# Patient Record
Sex: FEMALE | Race: WHITE | NOT HISPANIC OR LATINO
[De-identification: names, ages, dates, MRNs, and addresses within clinical notes are randomized per-mention and may not be internally consistent; named-entity substitution may affect disease eponyms.]

---

## 2017-04-19 ENCOUNTER — TRANSCRIPTION ENCOUNTER (OUTPATIENT)
Age: 32
End: 2017-04-19

## 2018-11-06 ENCOUNTER — TRANSCRIPTION ENCOUNTER (OUTPATIENT)
Age: 33
End: 2018-11-06

## 2019-01-30 ENCOUNTER — OUTPATIENT (OUTPATIENT)
Dept: OUTPATIENT SERVICES | Facility: HOSPITAL | Age: 34
LOS: 1 days | End: 2019-01-30
Payer: COMMERCIAL

## 2019-01-30 VITALS
SYSTOLIC BLOOD PRESSURE: 135 MMHG | DIASTOLIC BLOOD PRESSURE: 79 MMHG | TEMPERATURE: 98 F | WEIGHT: 184.97 LBS | RESPIRATION RATE: 16 BRPM | OXYGEN SATURATION: 99 % | HEART RATE: 78 BPM

## 2019-01-30 DIAGNOSIS — Z98.890 OTHER SPECIFIED POSTPROCEDURAL STATES: Chronic | ICD-10-CM

## 2019-01-30 DIAGNOSIS — N83.209 UNSPECIFIED OVARIAN CYST, UNSPECIFIED SIDE: ICD-10-CM

## 2019-01-30 DIAGNOSIS — Z01.818 ENCOUNTER FOR OTHER PREPROCEDURAL EXAMINATION: ICD-10-CM

## 2019-01-30 LAB
HCG SERPL-ACNC: <1 MIU/ML — SIGNIFICANT CHANGE UP
HCT VFR BLD CALC: 38.7 % — SIGNIFICANT CHANGE UP (ref 34.5–45)
HGB BLD-MCNC: 13.1 G/DL — SIGNIFICANT CHANGE UP (ref 11.5–15.5)
MCHC RBC-ENTMCNC: 32.2 PG — SIGNIFICANT CHANGE UP (ref 27–34)
MCHC RBC-ENTMCNC: 33.9 GM/DL — SIGNIFICANT CHANGE UP (ref 32–36)
MCV RBC AUTO: 95.1 FL — SIGNIFICANT CHANGE UP (ref 80–100)
NRBC # BLD: 0 /100 WBCS — SIGNIFICANT CHANGE UP (ref 0–0)
PLATELET # BLD AUTO: 205 K/UL — SIGNIFICANT CHANGE UP (ref 150–400)
RBC # BLD: 4.07 M/UL — SIGNIFICANT CHANGE UP (ref 3.8–5.2)
RBC # FLD: 12.9 % — SIGNIFICANT CHANGE UP (ref 10.3–14.5)
WBC # BLD: 6.91 K/UL — SIGNIFICANT CHANGE UP (ref 3.8–10.5)
WBC # FLD AUTO: 6.91 K/UL — SIGNIFICANT CHANGE UP (ref 3.8–10.5)

## 2019-01-30 PROCEDURE — 86900 BLOOD TYPING SEROLOGIC ABO: CPT

## 2019-01-30 PROCEDURE — G0463: CPT

## 2019-01-30 PROCEDURE — 85027 COMPLETE CBC AUTOMATED: CPT

## 2019-01-30 PROCEDURE — 86850 RBC ANTIBODY SCREEN: CPT

## 2019-01-30 PROCEDURE — 36415 COLL VENOUS BLD VENIPUNCTURE: CPT

## 2019-01-30 PROCEDURE — 86901 BLOOD TYPING SEROLOGIC RH(D): CPT

## 2019-01-30 PROCEDURE — 84702 CHORIONIC GONADOTROPIN TEST: CPT

## 2019-01-30 RX ORDER — DEXTROAMPHETAMINE SACCHARATE, AMPHETAMINE ASPARTATE, DEXTROAMPHETAMINE SULFATE AND AMPHETAMINE SULFATE 1.875; 1.875; 1.875; 1.875 MG/1; MG/1; MG/1; MG/1
0 TABLET ORAL
Qty: 0 | Refills: 0 | COMMUNITY

## 2019-01-30 NOTE — H&P PST ADULT - HISTORY OF PRESENT ILLNESS
34 yo female with PMH of PCOS and hypothyroidism here for PST. Pt complaining of having irregular menses for the last 2 years. Pt started to have abdominal bloating and abdominal cramping. Pt s/p MRI and was diagnosed with cyst of right ovary. Pt denies n/v/d and constipation. Pt electing for laparoscopic right ovarian cystectomy on 2/6/19.

## 2019-01-30 NOTE — H&P PST ADULT - PSYCHIATRIC
details… negative Affect and characteristics of appearance, verbalizations, behaviors are appropriate

## 2019-01-30 NOTE — H&P PST ADULT - NSANTHOSAYNRD_GEN_A_CORE
No. ANTON screening performed.  STOP BANG Legend: 0-2 = LOW Risk; 3-4 = INTERMEDIATE Risk; 5-8 = HIGH Risk

## 2019-01-30 NOTE — H&P PST ADULT - PMH
ADD (attention deficit disorder)    Hypothyroidism    PCOS (polycystic ovarian syndrome) ADD (attention deficit disorder)    Hypothyroidism    PCOS (polycystic ovarian syndrome)    Unspecified ovarian cyst, right side

## 2019-01-30 NOTE — H&P PST ADULT - FAMILY HISTORY
Father  Still living? Yes, Estimated age: Age Unknown  Hypertension, Age at diagnosis: Age Unknown     Mother  Still living? Yes, Estimated age: Age Unknown  Diabetes mellitus, Age at diagnosis: Age Unknown     Sibling  Still living? Yes, Estimated age: Age Unknown  Hypothyroidism, Age at diagnosis: Age Unknown  Bipolar disorder, Age at diagnosis: Age Unknown

## 2019-01-30 NOTE — H&P PST ADULT - NEGATIVE CARDIOVASCULAR SYMPTOMS
no peripheral edema/no palpitations/no orthopnea/no paroxysmal nocturnal dyspnea/no chest pain/no claudication/no dyspnea on exertion

## 2019-01-30 NOTE — H&P PST ADULT - PROBLEM SELECTOR PLAN 2
No medical clearance needed as per surgeon. CBC, T&S and HCG ordered. Pre-op instructions and surgical scrubs given and pt verbalized understanding.

## 2019-01-30 NOTE — H&P PST ADULT - GASTROINTESTINAL DETAILS
normal/no rigidity/no rebound tenderness/nontender/no organomegaly/bowel sounds normal/soft/no guarding/no distention/no bruit/no masses palpable

## 2019-01-30 NOTE — H&P PST ADULT - RS GEN PE MLT RESP DETAILS PC
good air movement/airway patent/clear to auscultation bilaterally/breath sounds equal/normal/respirations non-labored

## 2019-01-30 NOTE — H&P PST ADULT - PSH
History of D&C  (Missed Ab, 2008  History of sinus surgery  (1992) History of D&C  (Missed Ab, 2008)  History of sinus surgery  (1992)

## 2019-02-05 ENCOUNTER — TRANSCRIPTION ENCOUNTER (OUTPATIENT)
Age: 34
End: 2019-02-05

## 2019-02-05 RX ORDER — SODIUM CHLORIDE 9 MG/ML
1000 INJECTION, SOLUTION INTRAVENOUS
Qty: 0 | Refills: 0 | Status: DISCONTINUED | OUTPATIENT
Start: 2019-02-06 | End: 2019-02-21

## 2019-02-06 ENCOUNTER — RESULT REVIEW (OUTPATIENT)
Age: 34
End: 2019-02-06

## 2019-02-06 ENCOUNTER — OUTPATIENT (OUTPATIENT)
Dept: OUTPATIENT SERVICES | Facility: HOSPITAL | Age: 34
LOS: 1 days | End: 2019-02-06
Payer: COMMERCIAL

## 2019-02-06 VITALS
RESPIRATION RATE: 12 BRPM | TEMPERATURE: 98 F | HEART RATE: 80 BPM | OXYGEN SATURATION: 96 % | DIASTOLIC BLOOD PRESSURE: 77 MMHG | SYSTOLIC BLOOD PRESSURE: 128 MMHG

## 2019-02-06 DIAGNOSIS — Z98.890 OTHER SPECIFIED POSTPROCEDURAL STATES: Chronic | ICD-10-CM

## 2019-02-06 DIAGNOSIS — Z01.818 ENCOUNTER FOR OTHER PREPROCEDURAL EXAMINATION: ICD-10-CM

## 2019-02-06 DIAGNOSIS — N83.209 UNSPECIFIED OVARIAN CYST, UNSPECIFIED SIDE: ICD-10-CM

## 2019-02-06 DIAGNOSIS — N83.201 UNSPECIFIED OVARIAN CYST, RIGHT SIDE: ICD-10-CM

## 2019-02-06 PROCEDURE — 88305 TISSUE EXAM BY PATHOLOGIST: CPT | Mod: 26

## 2019-02-06 PROCEDURE — 88305 TISSUE EXAM BY PATHOLOGIST: CPT

## 2019-02-06 PROCEDURE — 88108 CYTOPATH CONCENTRATE TECH: CPT | Mod: 26

## 2019-02-06 PROCEDURE — C1889: CPT

## 2019-02-06 PROCEDURE — 58662 LAPAROSCOPY EXCISE LESIONS: CPT

## 2019-02-06 RX ORDER — NIACIN 50 MG
1 TABLET ORAL
Qty: 0 | Refills: 0 | COMMUNITY

## 2019-02-06 RX ORDER — OXYCODONE HYDROCHLORIDE 5 MG/1
5 TABLET ORAL ONCE
Qty: 0 | Refills: 0 | Status: DISCONTINUED | OUTPATIENT
Start: 2019-02-06 | End: 2019-02-06

## 2019-02-06 RX ORDER — HYDROMORPHONE HYDROCHLORIDE 2 MG/ML
0.5 INJECTION INTRAMUSCULAR; INTRAVENOUS; SUBCUTANEOUS
Qty: 0 | Refills: 0 | Status: DISCONTINUED | OUTPATIENT
Start: 2019-02-06 | End: 2019-02-06

## 2019-02-06 RX ORDER — SODIUM CHLORIDE 9 MG/ML
1000 INJECTION, SOLUTION INTRAVENOUS
Qty: 0 | Refills: 0 | Status: DISCONTINUED | OUTPATIENT
Start: 2019-02-06 | End: 2019-02-06

## 2019-02-06 RX ORDER — LEVOTHYROXINE SODIUM 125 MCG
1 TABLET ORAL
Qty: 0 | Refills: 0 | COMMUNITY

## 2019-02-06 RX ORDER — METOCLOPRAMIDE HCL 10 MG
5 TABLET ORAL ONCE
Qty: 0 | Refills: 0 | Status: COMPLETED | OUTPATIENT
Start: 2019-02-06 | End: 2019-02-06

## 2019-02-06 RX ORDER — DEXTROAMPHETAMINE SACCHARATE, AMPHETAMINE ASPARTATE, DEXTROAMPHETAMINE SULFATE AND AMPHETAMINE SULFATE 1.875; 1.875; 1.875; 1.875 MG/1; MG/1; MG/1; MG/1
0 TABLET ORAL
Qty: 0 | Refills: 0 | COMMUNITY

## 2019-02-06 RX ADMIN — Medication 5 MILLIGRAM(S): at 13:08

## 2019-02-06 RX ADMIN — SODIUM CHLORIDE 100 MILLILITER(S): 9 INJECTION, SOLUTION INTRAVENOUS at 08:40

## 2019-02-06 RX ADMIN — HYDROMORPHONE HYDROCHLORIDE 0.5 MILLIGRAM(S): 2 INJECTION INTRAMUSCULAR; INTRAVENOUS; SUBCUTANEOUS at 13:07

## 2019-02-06 RX ADMIN — HYDROMORPHONE HYDROCHLORIDE 0.5 MILLIGRAM(S): 2 INJECTION INTRAMUSCULAR; INTRAVENOUS; SUBCUTANEOUS at 13:22

## 2019-02-06 RX ADMIN — SODIUM CHLORIDE 100 MILLILITER(S): 9 INJECTION, SOLUTION INTRAVENOUS at 13:08

## 2019-02-06 NOTE — BRIEF OPERATIVE NOTE - PROCEDURE
<<-----Click on this checkbox to enter Procedure Ovarian cystectomy  02/06/2019  laparoscopic right ovarian cystectomy  Active  LPIBOON

## 2019-02-06 NOTE — BRIEF OPERATIVE NOTE - OPERATION/FINDINGS
10cm right ovarian simple cyst with 4-5 cm dermoid daughter cyst  Grossly normal right and left tube, lefty ovary and uterus

## 2019-02-06 NOTE — BRIEF OPERATIVE NOTE - VENOUS THROMBOEMBOLISM PROPHYLAXIS THERAPY
39.6 week, precip vaginal, apgars 9/9, admit for upper airway conmgestion.    DOL  Wt  In  Urine  Stool    EFEN  Resp  CV hemodynamically stable  ID  Neuro    Plan  Labs; 39.6 week, precip vaginal, apgars 9/9, admit for upper airway conmgestion.    DOL 1  Wt  3320 +20  In  po adlib BF  Urine x4  Stool  x1    FEN PO ad jorge BF with occ supplementation  Resp; stable on RA, x-ray normal  CV hemodynamically stable  ID  no risk factors  Neuro  appropriate for age  Social  talked with parents 1/12  Plan; dc later today  Labs; Venodynes

## 2019-02-06 NOTE — PROGRESS NOTE ADULT - SUBJECTIVE AND OBJECTIVE BOX
Bilateral  TAP  Nerve Block Note:    Consent obtained preop, site marked.      Risks, benefits and alternatives discussed. Risks included but were not limited to infection, local anesthetic toxicity, permanent or temporary nerve damage or injury, failed or partial block, catheter dislodgement (if one used,) bleeding, vascular injury, hematoma.    Patient verbally stated they understood and agreed to proceed with the block.      Procedure:    Time out performed, sites verified, sterile prep with chlorhexidine and drape bilaterally. A sterile, ultrasound-guided block was performed bilaterally. Using a new  20  G regional block needle for each side, with good visualization of the needle at all times.  20cc of   0.2 % Ropivacaine injected, on each side,  easily with no heme after aspirating every 5cc.      Hard copies of ultrasound images obtained.    Procedure well tolerated without complications.

## 2019-02-06 NOTE — ASU PATIENT PROFILE, ADULT - PMH
ADD (attention deficit disorder)    Hypothyroidism    PCOS (polycystic ovarian syndrome)    Unspecified ovarian cyst, right side

## 2019-02-06 NOTE — ASU DISCHARGE PLAN (ADULT/PEDIATRIC). - SPECIAL INSTRUCTIONS
No intercourse/douching/tampons for 2 weeks. Avoid strenuous activity, exercise, heavy lifting for two weeks. You may shower freely, but do not soak in the tub or swim. Eat according to your appetite. Please follow all written and verbal instructions, and call us if you are having any acute problems, including (but not limited to) excessive vaginal bleeding (soaking pads), fever >100 degrees, persistent nausea or vomiting.

## 2019-11-09 ENCOUNTER — TRANSCRIPTION ENCOUNTER (OUTPATIENT)
Age: 34
End: 2019-11-09

## 2022-08-03 PROBLEM — N83.201 UNSPECIFIED OVARIAN CYST, RIGHT SIDE: Chronic | Status: ACTIVE | Noted: 2019-01-30

## 2022-08-03 PROBLEM — E03.9 HYPOTHYROIDISM, UNSPECIFIED: Chronic | Status: ACTIVE | Noted: 2019-01-29

## 2022-08-03 PROBLEM — E28.2 POLYCYSTIC OVARIAN SYNDROME: Chronic | Status: ACTIVE | Noted: 2019-01-30

## 2022-08-03 PROBLEM — F98.8 OTHER SPECIFIED BEHAVIORAL AND EMOTIONAL DISORDERS WITH ONSET USUALLY OCCURRING IN CHILDHOOD AND ADOLESCENCE: Chronic | Status: ACTIVE | Noted: 2019-01-29

## 2022-08-18 PROBLEM — Z00.00 ENCOUNTER FOR PREVENTIVE HEALTH EXAMINATION: Status: ACTIVE | Noted: 2022-08-18

## 2022-09-12 ENCOUNTER — APPOINTMENT (OUTPATIENT)
Dept: OBGYN | Facility: CLINIC | Age: 37
End: 2022-09-12

## 2022-09-12 ENCOUNTER — ASOB RESULT (OUTPATIENT)
Age: 37
End: 2022-09-12

## 2022-09-12 VITALS
DIASTOLIC BLOOD PRESSURE: 78 MMHG | HEIGHT: 67 IN | SYSTOLIC BLOOD PRESSURE: 123 MMHG | BODY MASS INDEX: 32.49 KG/M2 | WEIGHT: 207 LBS

## 2022-09-12 DIAGNOSIS — Z3A.12 12 WEEKS GESTATION OF PREGNANCY: ICD-10-CM

## 2022-09-12 PROCEDURE — 76813 OB US NUCHAL MEAS 1 GEST: CPT

## 2022-09-12 PROCEDURE — 36415 COLL VENOUS BLD VENIPUNCTURE: CPT

## 2022-09-12 PROCEDURE — 0502F SUBSEQUENT PRENATAL CARE: CPT

## 2022-09-13 ENCOUNTER — TRANSCRIPTION ENCOUNTER (OUTPATIENT)
Age: 37
End: 2022-09-13

## 2022-09-13 ENCOUNTER — NON-APPOINTMENT (OUTPATIENT)
Age: 37
End: 2022-09-13

## 2022-09-13 LAB
ABO + RH PNL BLD: NORMAL
ALBUMIN SERPL ELPH-MCNC: 4.1 G/DL
ALP BLD-CCNC: 59 U/L
ALT SERPL-CCNC: 10 U/L
ANION GAP SERPL CALC-SCNC: 16 MMOL/L
AST SERPL-CCNC: 13 U/L
BASOPHILS # BLD AUTO: 0.02 K/UL
BASOPHILS NFR BLD AUTO: 0.2 %
BILIRUB SERPL-MCNC: <0.2 MG/DL
BLD GP AB SCN SERPL QL: NORMAL
BUN SERPL-MCNC: 7 MG/DL
C TRACH RRNA SPEC QL NAA+PROBE: NOT DETECTED
CALCIUM SERPL-MCNC: 9.3 MG/DL
CHLORIDE SERPL-SCNC: 99 MMOL/L
CO2 SERPL-SCNC: 20 MMOL/L
CREAT SERPL-MCNC: 0.46 MG/DL
EGFR: 128 ML/MIN/1.73M2
EOSINOPHIL # BLD AUTO: 0.32 K/UL
EOSINOPHIL NFR BLD AUTO: 2.5 %
GLUCOSE SERPL-MCNC: 50 MG/DL
HBV SURFACE AG SER QL: NONREACTIVE
HCT VFR BLD CALC: 34.5 %
HCV AB SER QL: NONREACTIVE
HCV S/CO RATIO: 0.09 S/CO
HGB A MFR BLD: 97.4 %
HGB A2 MFR BLD: 2.6 %
HGB BLD-MCNC: 11.6 G/DL
HGB FRACT BLD-IMP: NORMAL
HIV1+2 AB SPEC QL IA.RAPID: NONREACTIVE
HPV HIGH+LOW RISK DNA PNL CVX: NOT DETECTED
IMM GRANULOCYTES NFR BLD AUTO: 1.3 %
LYMPHOCYTES # BLD AUTO: 1.82 K/UL
LYMPHOCYTES NFR BLD AUTO: 14.4 %
MAN DIFF?: NORMAL
MCHC RBC-ENTMCNC: 31.9 PG
MCHC RBC-ENTMCNC: 33.6 GM/DL
MCV RBC AUTO: 94.8 FL
MEV IGG FLD QL IA: 89.1 AU/ML
MEV IGG+IGM SER-IMP: POSITIVE
MONOCYTES # BLD AUTO: 0.64 K/UL
MONOCYTES NFR BLD AUTO: 5.1 %
N GONORRHOEA RRNA SPEC QL NAA+PROBE: NOT DETECTED
NEUTROPHILS # BLD AUTO: 9.7 K/UL
NEUTROPHILS NFR BLD AUTO: 76.5 %
PLATELET # BLD AUTO: 213 K/UL
POTASSIUM SERPL-SCNC: 3.8 MMOL/L
PROT SERPL-MCNC: 6.9 G/DL
RBC # BLD: 3.64 M/UL
RBC # FLD: 12.8 %
RUBV IGG FLD-ACNC: 2.5 INDEX
RUBV IGG SER-IMP: POSITIVE
SODIUM SERPL-SCNC: 136 MMOL/L
SOURCE TP AMPLIFICATION: NORMAL
T PALLIDUM AB SER QL IA: NEGATIVE
T4 FREE SERPL-MCNC: 1.6 NG/DL
TSH SERPL-ACNC: 0.42 UIU/ML
VZV AB TITR SER: POSITIVE
VZV IGG SER IF-ACNC: 695 INDEX
WBC # FLD AUTO: 12.67 K/UL

## 2022-09-14 LAB
BACTERIA UR CULT: NORMAL
LEAD BLD-MCNC: <1 UG/DL

## 2022-09-16 LAB — CYTOLOGY CVX/VAG DOC THIN PREP: NORMAL

## 2022-09-21 ENCOUNTER — NON-APPOINTMENT (OUTPATIENT)
Age: 37
End: 2022-09-21

## 2022-09-22 ENCOUNTER — NON-APPOINTMENT (OUTPATIENT)
Age: 37
End: 2022-09-22

## 2022-10-12 ENCOUNTER — APPOINTMENT (OUTPATIENT)
Dept: OBGYN | Facility: CLINIC | Age: 37
End: 2022-10-12

## 2022-10-12 ENCOUNTER — ASOB RESULT (OUTPATIENT)
Age: 37
End: 2022-10-12

## 2022-10-12 VITALS
HEIGHT: 67 IN | WEIGHT: 207 LBS | SYSTOLIC BLOOD PRESSURE: 122 MMHG | DIASTOLIC BLOOD PRESSURE: 78 MMHG | BODY MASS INDEX: 32.49 KG/M2

## 2022-10-12 DIAGNOSIS — Z3A.16 16 WEEKS GESTATION OF PREGNANCY: ICD-10-CM

## 2022-10-12 PROCEDURE — 0502F SUBSEQUENT PRENATAL CARE: CPT

## 2022-10-12 PROCEDURE — 36415 COLL VENOUS BLD VENIPUNCTURE: CPT

## 2022-10-17 LAB
AFP MOM: 1.27
AFP VALUE: 39.9 NG/ML
ALPHA FETOPROTEIN SERUM COMMENT: NORMAL
ALPHA FETOPROTEIN SERUM INTERPRETATION: NORMAL
ALPHA FETOPROTEIN SERUM RESULTS: NORMAL
ALPHA FETOPROTEIN SERUM TEST RESULTS: NORMAL
GESTATIONAL AGE BASED ON: NORMAL
GESTATIONAL AGE ON COLLECTION DATE: 16.7 WEEKS
INSULIN DEP DIABETES: NO
MATERNAL AGE AT EDD AFP: 36.3 YR
MULTIPLE GESTATION: NO
OSBR RISK 1 IN: 5251
RACE: NORMAL
WEIGHT AFP: 207 LBS

## 2022-11-04 ENCOUNTER — APPOINTMENT (OUTPATIENT)
Dept: OBGYN | Facility: CLINIC | Age: 37
End: 2022-11-04

## 2022-11-04 ENCOUNTER — ASOB RESULT (OUTPATIENT)
Age: 37
End: 2022-11-04

## 2022-11-04 VITALS — WEIGHT: 217 LBS | DIASTOLIC BLOOD PRESSURE: 76 MMHG | BODY MASS INDEX: 33.99 KG/M2 | SYSTOLIC BLOOD PRESSURE: 116 MMHG

## 2022-11-04 PROCEDURE — 0502F SUBSEQUENT PRENATAL CARE: CPT

## 2022-11-04 PROCEDURE — 76805 OB US >/= 14 WKS SNGL FETUS: CPT

## 2022-11-16 ENCOUNTER — NON-APPOINTMENT (OUTPATIENT)
Age: 37
End: 2022-11-16

## 2022-11-22 ENCOUNTER — NON-APPOINTMENT (OUTPATIENT)
Age: 37
End: 2022-11-22

## 2022-12-02 ENCOUNTER — APPOINTMENT (OUTPATIENT)
Dept: OBGYN | Facility: CLINIC | Age: 37
End: 2022-12-02

## 2022-12-02 VITALS — WEIGHT: 224 LBS | DIASTOLIC BLOOD PRESSURE: 80 MMHG | SYSTOLIC BLOOD PRESSURE: 125 MMHG | BODY MASS INDEX: 35.08 KG/M2

## 2022-12-02 DIAGNOSIS — Z3A.24 24 WEEKS GESTATION OF PREGNANCY: ICD-10-CM

## 2022-12-02 PROCEDURE — 0502F SUBSEQUENT PRENATAL CARE: CPT

## 2022-12-02 PROCEDURE — 36415 COLL VENOUS BLD VENIPUNCTURE: CPT

## 2022-12-13 LAB — GLUCOSE 1H P 50 G GLC PO SERPL-MCNC: 101 MG/DL

## 2022-12-27 ENCOUNTER — NON-APPOINTMENT (OUTPATIENT)
Age: 37
End: 2022-12-27

## 2022-12-30 ENCOUNTER — NON-APPOINTMENT (OUTPATIENT)
Age: 37
End: 2022-12-30

## 2022-12-30 ENCOUNTER — APPOINTMENT (OUTPATIENT)
Dept: OBGYN | Facility: CLINIC | Age: 37
End: 2022-12-30
Payer: COMMERCIAL

## 2022-12-30 VITALS
BODY MASS INDEX: 36.57 KG/M2 | SYSTOLIC BLOOD PRESSURE: 132 MMHG | HEIGHT: 67 IN | DIASTOLIC BLOOD PRESSURE: 84 MMHG | WEIGHT: 233 LBS

## 2022-12-30 DIAGNOSIS — Z3A.28 28 WEEKS GESTATION OF PREGNANCY: ICD-10-CM

## 2022-12-30 DIAGNOSIS — Z23 ENCOUNTER FOR IMMUNIZATION: ICD-10-CM

## 2022-12-30 DIAGNOSIS — Z34.93 ENCOUNTER FOR SUPERVISION OF NORMAL PREGNANCY, UNSPECIFIED, THIRD TRIMESTER: ICD-10-CM

## 2022-12-30 LAB
BASOPHILS # BLD AUTO: 0.06 K/UL
BASOPHILS NFR BLD AUTO: 0.4 %
EOSINOPHIL # BLD AUTO: 0.22 K/UL
EOSINOPHIL NFR BLD AUTO: 1.6 %
GLUCOSE 1H P 50 G GLC PO SERPL-MCNC: 135 MG/DL
HCT VFR BLD CALC: 35.7 %
HGB BLD-MCNC: 11.7 G/DL
IMM GRANULOCYTES NFR BLD AUTO: 4.4 %
LYMPHOCYTES # BLD AUTO: 1.42 K/UL
LYMPHOCYTES NFR BLD AUTO: 10.6 %
MAN DIFF?: NORMAL
MCHC RBC-ENTMCNC: 31.7 PG
MCHC RBC-ENTMCNC: 32.8 GM/DL
MCV RBC AUTO: 96.7 FL
MONOCYTES # BLD AUTO: 0.75 K/UL
MONOCYTES NFR BLD AUTO: 5.6 %
NEUTROPHILS # BLD AUTO: 10.39 K/UL
NEUTROPHILS NFR BLD AUTO: 77.4 %
PLATELET # BLD AUTO: 224 K/UL
RBC # BLD: 3.69 M/UL
RBC # FLD: 13.9 %
WBC # FLD AUTO: 13.43 K/UL

## 2022-12-30 PROCEDURE — 0502F SUBSEQUENT PRENATAL CARE: CPT

## 2023-01-02 LAB — HIV1+2 AB SPEC QL IA.RAPID: NONREACTIVE

## 2023-01-03 ENCOUNTER — NON-APPOINTMENT (OUTPATIENT)
Age: 38
End: 2023-01-03

## 2023-01-03 DIAGNOSIS — R73.09 OTHER ABNORMAL GLUCOSE: ICD-10-CM

## 2023-01-03 LAB — BLD GP AB SCN SERPL QL: NORMAL

## 2023-01-05 ENCOUNTER — APPOINTMENT (OUTPATIENT)
Dept: OBGYN | Facility: CLINIC | Age: 38
End: 2023-01-05
Payer: COMMERCIAL

## 2023-01-05 ENCOUNTER — ASOB RESULT (OUTPATIENT)
Age: 38
End: 2023-01-05

## 2023-01-05 PROCEDURE — 76816 OB US FOLLOW-UP PER FETUS: CPT

## 2023-01-05 PROCEDURE — 76819 FETAL BIOPHYS PROFIL W/O NST: CPT | Mod: 59

## 2023-01-11 ENCOUNTER — NON-APPOINTMENT (OUTPATIENT)
Age: 38
End: 2023-01-11

## 2023-01-25 ENCOUNTER — NON-APPOINTMENT (OUTPATIENT)
Age: 38
End: 2023-01-25

## 2023-01-27 ENCOUNTER — NON-APPOINTMENT (OUTPATIENT)
Age: 38
End: 2023-01-27

## 2023-01-27 ENCOUNTER — APPOINTMENT (OUTPATIENT)
Dept: OBGYN | Facility: CLINIC | Age: 38
End: 2023-01-27
Payer: COMMERCIAL

## 2023-01-27 ENCOUNTER — ASOB RESULT (OUTPATIENT)
Age: 38
End: 2023-01-27

## 2023-01-27 VITALS — BODY MASS INDEX: 36.02 KG/M2 | WEIGHT: 230 LBS | DIASTOLIC BLOOD PRESSURE: 83 MMHG | SYSTOLIC BLOOD PRESSURE: 128 MMHG

## 2023-01-27 DIAGNOSIS — Z3A.32 32 WEEKS GESTATION OF PREGNANCY: ICD-10-CM

## 2023-01-27 PROCEDURE — 0502F SUBSEQUENT PRENATAL CARE: CPT

## 2023-01-27 PROCEDURE — 76816 OB US FOLLOW-UP PER FETUS: CPT

## 2023-01-27 PROCEDURE — 36415 COLL VENOUS BLD VENIPUNCTURE: CPT

## 2023-01-27 PROCEDURE — 76819 FETAL BIOPHYS PROFIL W/O NST: CPT | Mod: 59

## 2023-02-16 ENCOUNTER — NON-APPOINTMENT (OUTPATIENT)
Age: 38
End: 2023-02-16

## 2023-02-16 ENCOUNTER — APPOINTMENT (OUTPATIENT)
Dept: OBGYN | Facility: CLINIC | Age: 38
End: 2023-02-16
Payer: COMMERCIAL

## 2023-02-16 ENCOUNTER — ASOB RESULT (OUTPATIENT)
Age: 38
End: 2023-02-16

## 2023-02-16 VITALS
WEIGHT: 243 LBS | DIASTOLIC BLOOD PRESSURE: 85 MMHG | BODY MASS INDEX: 38.14 KG/M2 | SYSTOLIC BLOOD PRESSURE: 125 MMHG | HEIGHT: 67 IN

## 2023-02-16 VITALS — DIASTOLIC BLOOD PRESSURE: 74 MMHG | SYSTOLIC BLOOD PRESSURE: 110 MMHG

## 2023-02-16 DIAGNOSIS — Z3A.34 34 WEEKS GESTATION OF PREGNANCY: ICD-10-CM

## 2023-02-16 PROCEDURE — 0502F SUBSEQUENT PRENATAL CARE: CPT

## 2023-02-16 PROCEDURE — 76819 FETAL BIOPHYS PROFIL W/O NST: CPT | Mod: 59

## 2023-02-16 PROCEDURE — 76816 OB US FOLLOW-UP PER FETUS: CPT

## 2023-02-16 PROCEDURE — 36415 COLL VENOUS BLD VENIPUNCTURE: CPT

## 2023-02-17 ENCOUNTER — NON-APPOINTMENT (OUTPATIENT)
Age: 38
End: 2023-02-17

## 2023-02-17 LAB
ALBUMIN SERPL ELPH-MCNC: 3.8 G/DL
ALP BLD-CCNC: 103 U/L
ALT SERPL-CCNC: 11 U/L
ANION GAP SERPL CALC-SCNC: 15 MMOL/L
AST SERPL-CCNC: 15 U/L
BASOPHILS # BLD AUTO: 0.07 K/UL
BASOPHILS NFR BLD AUTO: 0.5 %
BILIRUB SERPL-MCNC: <0.2 MG/DL
BUN SERPL-MCNC: 7 MG/DL
CALCIUM SERPL-MCNC: 9.5 MG/DL
CHLORIDE SERPL-SCNC: 103 MMOL/L
CO2 SERPL-SCNC: 21 MMOL/L
CREAT SERPL-MCNC: 0.55 MG/DL
CREAT SPEC-SCNC: 73 MG/DL
CREAT/PROT UR: 0.2 RATIO
EGFR: 122 ML/MIN/1.73M2
EOSINOPHIL # BLD AUTO: 0.29 K/UL
EOSINOPHIL NFR BLD AUTO: 1.9 %
ESTIMATED AVERAGE GLUCOSE: 111 MG/DL
GLUCOSE SERPL-MCNC: 62 MG/DL
HBA1C MFR BLD HPLC: 5.5 %
HCT VFR BLD CALC: 39 %
HGB BLD-MCNC: 12.3 G/DL
IMM GRANULOCYTES NFR BLD AUTO: 2.7 %
LYMPHOCYTES # BLD AUTO: 1.63 K/UL
LYMPHOCYTES NFR BLD AUTO: 10.5 %
MAN DIFF?: NORMAL
MCHC RBC-ENTMCNC: 30.6 PG
MCHC RBC-ENTMCNC: 31.5 GM/DL
MCV RBC AUTO: 97 FL
MONOCYTES # BLD AUTO: 1.12 K/UL
MONOCYTES NFR BLD AUTO: 7.2 %
NEUTROPHILS # BLD AUTO: 11.95 K/UL
NEUTROPHILS NFR BLD AUTO: 77.2 %
PLATELET # BLD AUTO: 220 K/UL
POTASSIUM SERPL-SCNC: 4.2 MMOL/L
PROT SERPL-MCNC: 6.6 G/DL
PROT UR-MCNC: 11 MG/DL
RBC # BLD: 4.02 M/UL
RBC # FLD: 14.5 %
SODIUM SERPL-SCNC: 138 MMOL/L
T GONDII AB SER-IMP: NEGATIVE
T GONDII AB SER-IMP: NEGATIVE
T GONDII IGG SER QL: 3.9 IU/ML
T GONDII IGM SER QL: <3 AU/ML
T4 FREE SERPL-MCNC: 1.1 NG/DL
TSH SERPL-ACNC: 1.29 UIU/ML
URATE SERPL-MCNC: 4.3 MG/DL
WBC # FLD AUTO: 15.48 K/UL

## 2023-02-22 ENCOUNTER — APPOINTMENT (OUTPATIENT)
Dept: ANTEPARTUM | Facility: CLINIC | Age: 38
End: 2023-02-22
Payer: COMMERCIAL

## 2023-02-22 ENCOUNTER — ASOB RESULT (OUTPATIENT)
Age: 38
End: 2023-02-22

## 2023-02-22 PROCEDURE — 99203 OFFICE O/P NEW LOW 30 MIN: CPT | Mod: 25

## 2023-02-22 PROCEDURE — 76819 FETAL BIOPHYS PROFIL W/O NST: CPT | Mod: 59

## 2023-02-22 PROCEDURE — 76816 OB US FOLLOW-UP PER FETUS: CPT

## 2023-02-23 ENCOUNTER — APPOINTMENT (OUTPATIENT)
Dept: OBGYN | Facility: CLINIC | Age: 38
End: 2023-02-23
Payer: COMMERCIAL

## 2023-02-23 VITALS — DIASTOLIC BLOOD PRESSURE: 78 MMHG | BODY MASS INDEX: 37.9 KG/M2 | WEIGHT: 242 LBS | SYSTOLIC BLOOD PRESSURE: 124 MMHG

## 2023-02-23 DIAGNOSIS — Z3A.35 35 WEEKS GESTATION OF PREGNANCY: ICD-10-CM

## 2023-02-23 DIAGNOSIS — E03.9 HYPOTHYROIDISM, UNSPECIFIED: ICD-10-CM

## 2023-02-23 LAB
B19V IGG SER QL IA: 0.76 INDEX
B19V IGG+IGM SER-IMP: NEGATIVE
B19V IGG+IGM SER-IMP: NORMAL
B19V IGM FLD-ACNC: 0.1 INDEX
B19V IGM SER-ACNC: NEGATIVE

## 2023-02-23 PROCEDURE — 59025 FETAL NON-STRESS TEST: CPT

## 2023-02-23 PROCEDURE — 0502F SUBSEQUENT PRENATAL CARE: CPT

## 2023-02-26 LAB — B-HEM STREP SPEC QL CULT: NORMAL

## 2023-03-01 ENCOUNTER — ASOB RESULT (OUTPATIENT)
Age: 38
End: 2023-03-01

## 2023-03-01 ENCOUNTER — APPOINTMENT (OUTPATIENT)
Dept: OBGYN | Facility: CLINIC | Age: 38
End: 2023-03-01
Payer: COMMERCIAL

## 2023-03-01 ENCOUNTER — APPOINTMENT (OUTPATIENT)
Dept: OBGYN | Facility: CLINIC | Age: 38
End: 2023-03-01

## 2023-03-01 ENCOUNTER — NON-APPOINTMENT (OUTPATIENT)
Age: 38
End: 2023-03-01

## 2023-03-01 VITALS — DIASTOLIC BLOOD PRESSURE: 84 MMHG | SYSTOLIC BLOOD PRESSURE: 128 MMHG | BODY MASS INDEX: 38.69 KG/M2 | WEIGHT: 247 LBS

## 2023-03-01 DIAGNOSIS — O40.9XX0 POLYHYDRAMNIOS, UNSPECIFIED TRIMESTER, NOT APPLICABLE OR UNSPECIFIED: ICD-10-CM

## 2023-03-01 DIAGNOSIS — Z3A.36 36 WEEKS GESTATION OF PREGNANCY: ICD-10-CM

## 2023-03-01 PROCEDURE — 76818 FETAL BIOPHYS PROFILE W/NST: CPT

## 2023-03-01 PROCEDURE — 0502F SUBSEQUENT PRENATAL CARE: CPT

## 2023-03-02 ENCOUNTER — OUTPATIENT (OUTPATIENT)
Dept: OUTPATIENT SERVICES | Facility: HOSPITAL | Age: 38
LOS: 1 days | End: 2023-03-02
Payer: COMMERCIAL

## 2023-03-02 VITALS
RESPIRATION RATE: 18 BRPM | SYSTOLIC BLOOD PRESSURE: 139 MMHG | WEIGHT: 246.92 LBS | HEART RATE: 96 BPM | OXYGEN SATURATION: 98 % | HEIGHT: 67 IN | DIASTOLIC BLOOD PRESSURE: 89 MMHG | TEMPERATURE: 98 F

## 2023-03-02 DIAGNOSIS — Z98.890 OTHER SPECIFIED POSTPROCEDURAL STATES: Chronic | ICD-10-CM

## 2023-03-02 DIAGNOSIS — Z29.9 ENCOUNTER FOR PROPHYLACTIC MEASURES, UNSPECIFIED: ICD-10-CM

## 2023-03-02 DIAGNOSIS — Z01.818 ENCOUNTER FOR OTHER PREPROCEDURAL EXAMINATION: ICD-10-CM

## 2023-03-02 LAB
ANION GAP SERPL CALC-SCNC: 12 MMOL/L — SIGNIFICANT CHANGE UP (ref 5–17)
BLD GP AB SCN SERPL QL: NEGATIVE — SIGNIFICANT CHANGE UP
BUN SERPL-MCNC: 6 MG/DL — LOW (ref 7–23)
CALCIUM SERPL-MCNC: 9.4 MG/DL — SIGNIFICANT CHANGE UP (ref 8.4–10.5)
CHLORIDE SERPL-SCNC: 105 MMOL/L — SIGNIFICANT CHANGE UP (ref 96–108)
CO2 SERPL-SCNC: 21 MMOL/L — LOW (ref 22–31)
CREAT SERPL-MCNC: 0.51 MG/DL — SIGNIFICANT CHANGE UP (ref 0.5–1.3)
EGFR: 124 ML/MIN/1.73M2 — SIGNIFICANT CHANGE UP
GLUCOSE SERPL-MCNC: 86 MG/DL — SIGNIFICANT CHANGE UP (ref 70–99)
HCT VFR BLD CALC: 37.1 % — SIGNIFICANT CHANGE UP (ref 34.5–45)
HGB BLD-MCNC: 12 G/DL — SIGNIFICANT CHANGE UP (ref 11.5–15.5)
MCHC RBC-ENTMCNC: 31.7 PG — SIGNIFICANT CHANGE UP (ref 27–34)
MCHC RBC-ENTMCNC: 32.3 GM/DL — SIGNIFICANT CHANGE UP (ref 32–36)
MCV RBC AUTO: 97.9 FL — SIGNIFICANT CHANGE UP (ref 80–100)
NRBC # BLD: 0 /100 WBCS — SIGNIFICANT CHANGE UP (ref 0–0)
PLATELET # BLD AUTO: 197 K/UL — SIGNIFICANT CHANGE UP (ref 150–400)
POTASSIUM SERPL-MCNC: 4.3 MMOL/L — SIGNIFICANT CHANGE UP (ref 3.5–5.3)
POTASSIUM SERPL-SCNC: 4.3 MMOL/L — SIGNIFICANT CHANGE UP (ref 3.5–5.3)
RBC # BLD: 3.79 M/UL — LOW (ref 3.8–5.2)
RBC # FLD: 14.2 % — SIGNIFICANT CHANGE UP (ref 10.3–14.5)
RH IG SCN BLD-IMP: POSITIVE — SIGNIFICANT CHANGE UP
SODIUM SERPL-SCNC: 138 MMOL/L — SIGNIFICANT CHANGE UP (ref 135–145)
WBC # BLD: 13.84 K/UL — HIGH (ref 3.8–10.5)
WBC # FLD AUTO: 13.84 K/UL — HIGH (ref 3.8–10.5)

## 2023-03-02 PROCEDURE — 86850 RBC ANTIBODY SCREEN: CPT

## 2023-03-02 PROCEDURE — 80048 BASIC METABOLIC PNL TOTAL CA: CPT

## 2023-03-02 PROCEDURE — G0463: CPT

## 2023-03-02 PROCEDURE — 85027 COMPLETE CBC AUTOMATED: CPT

## 2023-03-02 PROCEDURE — 86901 BLOOD TYPING SEROLOGIC RH(D): CPT

## 2023-03-02 PROCEDURE — 86900 BLOOD TYPING SEROLOGIC ABO: CPT

## 2023-03-02 RX ORDER — OXYTOCIN 10 UNIT/ML
333.33 VIAL (ML) INJECTION
Qty: 20 | Refills: 0 | Status: DISCONTINUED | OUTPATIENT
Start: 2023-03-20 | End: 2023-03-23

## 2023-03-02 NOTE — OB PST NOTE - NSICDXPASTMEDICALHX_GEN_ALL_CORE_FT
PAST MEDICAL HISTORY:  ADD (attention deficit disorder)     Hypothyroidism     PCOS (polycystic ovarian syndrome)     Unspecified ovarian cyst, right side

## 2023-03-02 NOTE — OB PST NOTE - NSHPPHYSICALEXAM_GEN_ALL_CORE
Constitutional: well developed, well nourished,  and no acute distress  Neurological: Alert & Oriented x 3  Respiratory: CTA B/L, No wheezing/crackles/rhonchi  Cardiovascular: (+) S1 & S2, RRR  Gastrointestinal: Gravid abdomen   Extremities: No pedal edema, No clubbing, No cyanosis  Skin:  normal skin

## 2023-03-02 NOTE — OB PST NOTE - NSICDXPASTSURGICALHX_GEN_ALL_CORE_FT
PAST SURGICAL HISTORY:  History of D&C (Missed Ab, 2008)    History of ovarian cystectomy     History of sinus surgery (1992)

## 2023-03-02 NOTE — OB PST NOTE - PROBLEM SELECTOR PLAN 2
Scheduled for Primary  on 3/20 due to Breech presentation on 3/20/23 with Dr. Connor  Preop instructions and chlorhexidine soap given  Labs CBC BMP T& perfomred in PST  Covid test on 3/17/23 @ [facility]

## 2023-03-02 NOTE — OB PST NOTE - NSHPREVIEWOFSYSTEMS_GEN_ALL_CORE
Denies any lightheadedness, dizziness, CP, palpitations or SOB  Denies any n/v or abdominal pain  Denies any abnormal vaginal bleeding

## 2023-03-02 NOTE — OB PST NOTE - NSICDXFAMILYHX_GEN_ALL_CORE_FT
FAMILY HISTORY:  Father  Still living? Yes, Estimated age: Age Unknown  Hypertension, Age at diagnosis: Age Unknown    Mother  Still living? Yes, Estimated age: Age Unknown  Diabetes mellitus, Age at diagnosis: Age Unknown    Sibling  Still living? Yes, Estimated age: Age Unknown  Bipolar disorder, Age at diagnosis: Age Unknown  Hypothyroidism, Age at diagnosis: Age Unknown

## 2023-03-02 NOTE — OB PST NOTE - HISTORY OF PRESENT ILLNESS
This is a 37 year old female  with past medical hypothyrodism, Outpatient Endo Dr. Sherlyn Ma      Presenting to PST for scheduled primary , on 3/20/23 with Dr. Connor due to Breech presentation. Scheduled for possible aversion on 3/7    **Covid swab on  3/17  ** Covid vaccinated  This is a 37 year old female  with past medical hypothyroidism outpatient Endo Dr. Sherlyn Ma. Prabhuy 36 weeks gestation.Presenting to [facility] for scheduled primary , on 3/20/23 with Dr. Connor due to Breech presentation. Scheduled for possible aversion on 3/7    **Covid swab on  3/17  ** Covid vaccinated

## 2023-03-02 NOTE — OB PST NOTE - ASSESSMENT
CAPRINI SCORE    AGE RELATED RISK FACTORS                                                       MOBILITY RELATED FACTORS  [ ] Age 41-60 years                                            (1 Point)                  [ ] Bed rest                                                        (1 Point)  [ ] Age: 61-74 years                                           (2 Points)                [ ] Plaster cast                                                   (2 Points)  [ ] Age= 75 years                                              (3 Points)                 [ ] Bed bound for more than 72 hours                   (2 Points)    DISEASE RELATED RISK FACTORS                                               GENDER SPECIFIC FACTORS  [ ] Edema in the lower extremities                       (1 Point)                  [x ] Pregnancy                                                     (1 Point)  [ ] Varicose veins                                               (1 Point)                  [ ] Post-partum < 6 weeks                                   (1 Point)             [x ] BMI > 25 Kg/m2                                            (1 Point)                  [ ] Hormonal therapy  or oral contraception            (1 Point)                 [ ] Sepsis (in the previous month)                        (1 Point)                  [ ] History of pregnancy complications  [ ] Pneumonia or serious lung disease                                               [ ] Unexplained or recurrent                       (1 Point)           (in the previous month)                               (1 Point)  [ ] Abnormal pulmonary function test                     (1 Point)                 SURGERY RELATED RISK FACTORS  [ ] Acute myocardial infarction                              (1 Point)                 [x ]  Section                                            (1 Point)  [ ] Congestive heart failure (in the previous month)  (1 Point)                 [ ] Minor surgery                                                 (1 Point)   [ ] Inflammatory bowel disease                             (1 Point)                 [ ] Arthroscopic surgery                                        (2 Points)  [ ] Central venous access                                    (2 Points)                [ ] General surgery lasting more than 45 minutes   (2 Points)       [ ] Stroke (in the previous month)                          (5 Points)               [ ] Elective arthroplasty                                        (5 Points)                                                                                                                                               HEMATOLOGY RELATED FACTORS                                                 TRAUMA RELATED RISK FACTORS  [ ] Prior episodes of VTE                                     (3 Points)                 [ ] Fracture of the hip, pelvis, or leg                       (5 Points)  [ ] Positive family history for VTE                         (3 Points)                 [ ] Acute spinal cord injury (in the previous month)  (5 Points)  [ ] Prothrombin 26530 A                                      (3 Points)                 [ ] Paralysis  (less than 1 month)                          (5 Points)  [ ] Factor V Leiden                                             (3 Points)                 [ ] Multiple Trauma within 1 month                         (5 Points)  [ ] Lupus anticoagulants                                     (3 Points)                                                           [ ] Anticardiolipin antibodies                                (3 Points)                                                       [ ] High homocysteine in the blood                      (3 Points)                                             [ ] Other congenital or acquired thrombophilia       (3 Points)                                                [ ] Heparin induced thrombocytopenia                  (3 Points)                                          Total Score [   3   ]

## 2023-03-02 NOTE — OB PST NOTE - NS PRO ABUSE SCREEN SUSPICION NEGLECT YN
3/10/2021    Cyndy Cmailo  [address]  [place] [address]      Dear Cyndy    ?????? ????????? ????????, ??? ?? ???????? ?????????????? ???????????? ?????????? ??? ????????? ?????. ?? ?????? ?????? ??? ????? ??? ?????????? ???????????? ???????????. ???? ? ??? ????????? ???????, ????????? ? ???????.              Gabriela Marie MD    [facility]  [address] [place] [address] [id number] [phone]  [address] [place] [address] [id number] [phone]  Dept: [phone]   36 year old man with PMH of chronic alcohol abuse brought in after a witnessed generalized seizure episode. Pt is admitted for alcohol withdrawals seizures and subclinical alcohol induced pancreatitis  no

## 2023-03-04 ENCOUNTER — OUTPATIENT (OUTPATIENT)
Dept: OUTPATIENT SERVICES | Facility: HOSPITAL | Age: 38
LOS: 1 days | End: 2023-03-04
Payer: COMMERCIAL

## 2023-03-04 VITALS
SYSTOLIC BLOOD PRESSURE: 129 MMHG | RESPIRATION RATE: 18 BRPM | DIASTOLIC BLOOD PRESSURE: 68 MMHG | TEMPERATURE: 98 F | HEART RATE: 100 BPM

## 2023-03-04 VITALS — OXYGEN SATURATION: 97 % | HEART RATE: 117 BPM

## 2023-03-04 DIAGNOSIS — Z98.890 OTHER SPECIFIED POSTPROCEDURAL STATES: Chronic | ICD-10-CM

## 2023-03-04 DIAGNOSIS — O26.899 OTHER SPECIFIED PREGNANCY RELATED CONDITIONS, UNSPECIFIED TRIMESTER: ICD-10-CM

## 2023-03-04 PROCEDURE — G0463: CPT

## 2023-03-04 PROCEDURE — 87635 SARS-COV-2 COVID-19 AMP PRB: CPT

## 2023-03-04 PROCEDURE — 96360 HYDRATION IV INFUSION INIT: CPT

## 2023-03-04 RX ORDER — SODIUM CHLORIDE 9 MG/ML
1000 INJECTION, SOLUTION INTRAVENOUS ONCE
Refills: 0 | Status: DISCONTINUED | OUTPATIENT
Start: 2023-03-04 | End: 2023-03-19

## 2023-03-04 NOTE — OB PROVIDER TRIAGE NOTE - HISTORY OF PRESENT ILLNESS
36yo P0 at 37+0 presenting with intermittent ctx at home. No LOF. No VB. +FM.     PNC c/b breech, polyhydraminos (GDM testing x2, negative).     ObHx P0  GynHx ovarian cystectomy lsc   PMHx hypothyroid   PSurgHx sinus surgery, lsc ovarian cystectomy   Meds Synthroid PNV  All NKDA

## 2023-03-04 NOTE — OB PROVIDER TRIAGE NOTE - NSOBPROVIDERNOTE_OBGYN_ALL_OB_FT
38yo P0 37+0 breech, poly, presenting with intermittent ctx. Not in labor.   - Fetal status reactive and reassuring   - Discharge home with return precuations    dw Dr. Geetha Jackson PGY4

## 2023-03-04 NOTE — OB PROVIDER TRIAGE NOTE - NSHPPHYSICALEXAM_GEN_ALL_CORE
Vital Signs Last 24 Hrs  T(C): 37.3 (04 Mar 2023 20:13), Max: 37.3 (04 Mar 2023 20:11)  T(F): 99.1 (04 Mar 2023 20:13), Max: 99.14 (04 Mar 2023 20:11)  HR: 108 (04 Mar 2023 20:31) (107 - 117)  BP: 111/63 (04 Mar 2023 20:13) (111/63 - 111/63)  BP(mean): --  RR: 20 (04 Mar 2023 20:13) (20 - 20)  SpO2: 98% (04 Mar 2023 20:31) (96% - 99%)      Gen NAD AOX3  Abd soft nontender gravid   Ext nontender    SVE c/l/h    BSUS breech, MVP 9.1, 8/8

## 2023-03-05 LAB — SARS-COV-2 RNA SPEC QL NAA+PROBE: SIGNIFICANT CHANGE UP

## 2023-03-06 DIAGNOSIS — O99.283 ENDOCRINE, NUTRITIONAL AND METABOLIC DISEASES COMPLICATING PREGNANCY, THIRD TRIMESTER: ICD-10-CM

## 2023-03-06 DIAGNOSIS — O09.512 SUPERVISION OF ELDERLY PRIMIGRAVIDA, SECOND TRIMESTER: ICD-10-CM

## 2023-03-06 DIAGNOSIS — Z3A.37 37 WEEKS GESTATION OF PREGNANCY: ICD-10-CM

## 2023-03-06 DIAGNOSIS — O47.1 FALSE LABOR AT OR AFTER 37 COMPLETED WEEKS OF GESTATION: ICD-10-CM

## 2023-03-06 DIAGNOSIS — F90.9 ATTENTION-DEFICIT HYPERACTIVITY DISORDER, UNSPECIFIED TYPE: ICD-10-CM

## 2023-03-06 DIAGNOSIS — Z87.42 PERSONAL HISTORY OF OTHER DISEASES OF THE FEMALE GENITAL TRACT: ICD-10-CM

## 2023-03-06 DIAGNOSIS — Z20.822 CONTACT WITH AND (SUSPECTED) EXPOSURE TO COVID-19: ICD-10-CM

## 2023-03-06 DIAGNOSIS — O99.343 OTHER MENTAL DISORDERS COMPLICATING PREGNANCY, THIRD TRIMESTER: ICD-10-CM

## 2023-03-06 DIAGNOSIS — O40.3XX0 POLYHYDRAMNIOS, THIRD TRIMESTER, NOT APPLICABLE OR UNSPECIFIED: ICD-10-CM

## 2023-03-06 DIAGNOSIS — E03.9 HYPOTHYROIDISM, UNSPECIFIED: ICD-10-CM

## 2023-03-07 ENCOUNTER — ASOB RESULT (OUTPATIENT)
Age: 38
End: 2023-03-07

## 2023-03-07 ENCOUNTER — NON-APPOINTMENT (OUTPATIENT)
Age: 38
End: 2023-03-07

## 2023-03-07 ENCOUNTER — OUTPATIENT (OUTPATIENT)
Dept: OUTPATIENT SERVICES | Facility: HOSPITAL | Age: 38
LOS: 1 days | End: 2023-03-07
Payer: COMMERCIAL

## 2023-03-07 ENCOUNTER — APPOINTMENT (OUTPATIENT)
Dept: ANTEPARTUM | Facility: CLINIC | Age: 38
End: 2023-03-07
Payer: COMMERCIAL

## 2023-03-07 VITALS — OXYGEN SATURATION: 96 % | HEART RATE: 101 BPM

## 2023-03-07 VITALS — HEART RATE: 90 BPM | OXYGEN SATURATION: 87 %

## 2023-03-07 DIAGNOSIS — Z98.890 OTHER SPECIFIED POSTPROCEDURAL STATES: Chronic | ICD-10-CM

## 2023-03-07 DIAGNOSIS — O26.899 OTHER SPECIFIED PREGNANCY RELATED CONDITIONS, UNSPECIFIED TRIMESTER: ICD-10-CM

## 2023-03-07 PROCEDURE — 59025 FETAL NON-STRESS TEST: CPT

## 2023-03-07 PROCEDURE — G0463: CPT

## 2023-03-07 PROCEDURE — 59412 ANTEPARTUM MANIPULATION: CPT

## 2023-03-07 PROCEDURE — 76819 FETAL BIOPHYS PROFIL W/O NST: CPT | Mod: 26

## 2023-03-07 NOTE — OB PROVIDER TRIAGE NOTE - NSOBPROVIDERNOTE_OBGYN_ALL_OB_FT
37 y.o.  @37w4d presenting for external cephalic version.    - Consents complete w/ Dr. Aranda for external cephalic version       ------------------------------------------------------    1200  - ECV successful   - 1hr NST -> discharge     seen w/ Dr. Aranda + Carly Hirschberg M Fellow   Pippa Gonzales PGY-3 37 y.o.  @37w4d presenting for external cephalic version.    - Consents complete w/ Dr. Aranda for external cephalic version       ------------------------------------------------------    1200  - ECV successful   - 1hr NST -> discharge     -------------------------------------------------------    - NST reactive  - Patient to follow up with private OB     Pippa Gonzales PGY-3    seen w/ Dr. Aranda + Carly Hirschberg M Fellow   Pippa Gonzales PGY-3 37 y.o.  @37w4d presenting for external cephalic version.    - Consents complete w/ Dr. Aranda for external cephalic version     ------------------------------------------------------    1200  - ECV successful   - 1hr NST -> discharge     seen w/ Dr. Aranda + Carly Hirschberg [facility] fellow     -------------------------------------------------------  Discharge BP noted to be 148/90. Noted to be 113/59 on repeat 15 min later. Patient denies any chest pain, HA, SOB, vision changes, RUQ pain or epigastric pain.    - NST reactive  - Patient to follow up with private OB as scheduled 3/9.   - Preeclampsia + labor precautions     d/w Dr. Iniguez,   Pippa Gonzales PGY-3

## 2023-03-07 NOTE — OB PROVIDER TRIAGE NOTE - NS_OBGYNHISTORY_OBGYN_ALL_OB_FT
OBHx:   - P0, pregnancy c/b polyhdramnios (25 on last scan 3/  GynHx: + hx of ovarian cysts, denies any fibroids, abnl pap smears, STIs

## 2023-03-07 NOTE — OB PROVIDER TRIAGE NOTE - HISTORY OF PRESENT ILLNESS
R3 OB Provider Triage Note     37 y.o.  @37w4d presenting for external cephalic version. Patient denies contractions, LOF, +FM. NPO since last night.     OBHx:   - P0, pregnancy c/b polyhdramnios (25 on last scan 3/  GynHx: + hx of ovarian cysts, denies any fibroids, abnl pap smears, STIs   PMHx: hypothyroidism   Meds: synthroid, PNV   SHx: Sinus SHx, Lsc ovarian cystectomy   Psych: denies any anxiety or depression   Social: denies any alcohol, tobacco, illicit substance use.   All:   - Penicillin -> hives   - Azithromycin + Cefzil -> swelling

## 2023-03-07 NOTE — OB PROVIDER TRIAGE NOTE - NSHPPHYSICALEXAM_GEN_ALL_CORE
Vital Signs Last 24 Hrs  T(C): 36.6 (07 Mar 2023 10:04), Max: 36.6 (07 Mar 2023 10:04)  T(F): 97.9 (07 Mar 2023 10:04), Max: 97.9 (07 Mar 2023 10:04)  HR: 83 (07 Mar 2023 11:16) (80 - 101)  BP: --  BP(mean): --  RR: --  SpO2: 99% (07 Mar 2023 11:16) (92% - 99%)    Parameters below as of 07 Mar 2023 10:04  Patient On (Oxygen Delivery Method): room air    General: well appearing, NAD   Pulmonary: breathing comfortably on room air   Cardiovascular: extremities well perfused   VE: deferred   TAUS: alexia breech presentation, posterior placenta, FRANKY 20 (per Carly Hirschberg PGY-6) Vital Signs Last 24 Hrs  T(C): 36.6 (07 Mar 2023 10:04), Max: 36.6 (07 Mar 2023 10:04)  T(F): 97.9 (07 Mar 2023 10:04), Max: 97.9 (07 Mar 2023 10:04)  HR: 83 (07 Mar 2023 11:16) (80 - 101)  BP: --  BP(mean): --  RR: --  SpO2: 99% (07 Mar 2023 11:16) (92% - 99%)    Parameters below as of 07 Mar 2023 10:04  Patient On (Oxygen Delivery Method): room air    General: well appearing, NAD   Pulmonary: breathing comfortably on room air   Cardiovascular: extremities well perfused   VE: deferred   TAUS: alexia breech presentation, posterior placenta, FRANKY 20 (performed by Carly Hirschberg PGY-6)  FHR: baseline 130, moderate variability, accelerations present, no decels  TOCO: quiet

## 2023-03-08 DIAGNOSIS — Z3A.37 37 WEEKS GESTATION OF PREGNANCY: ICD-10-CM

## 2023-03-08 DIAGNOSIS — O99.283 ENDOCRINE, NUTRITIONAL AND METABOLIC DISEASES COMPLICATING PREGNANCY, THIRD TRIMESTER: ICD-10-CM

## 2023-03-08 DIAGNOSIS — Z87.42 PERSONAL HISTORY OF OTHER DISEASES OF THE FEMALE GENITAL TRACT: ICD-10-CM

## 2023-03-08 DIAGNOSIS — E03.9 HYPOTHYROIDISM, UNSPECIFIED: ICD-10-CM

## 2023-03-08 DIAGNOSIS — E28.2 POLYCYSTIC OVARIAN SYNDROME: ICD-10-CM

## 2023-03-08 DIAGNOSIS — O40.3XX0 POLYHYDRAMNIOS, THIRD TRIMESTER, NOT APPLICABLE OR UNSPECIFIED: ICD-10-CM

## 2023-03-08 DIAGNOSIS — O99.343 OTHER MENTAL DISORDERS COMPLICATING PREGNANCY, THIRD TRIMESTER: ICD-10-CM

## 2023-03-08 DIAGNOSIS — O09.513 SUPERVISION OF ELDERLY PRIMIGRAVIDA, THIRD TRIMESTER: ICD-10-CM

## 2023-03-08 DIAGNOSIS — F90.9 ATTENTION-DEFICIT HYPERACTIVITY DISORDER, UNSPECIFIED TYPE: ICD-10-CM

## 2023-03-09 ENCOUNTER — ASOB RESULT (OUTPATIENT)
Age: 38
End: 2023-03-09

## 2023-03-09 ENCOUNTER — APPOINTMENT (OUTPATIENT)
Dept: OBGYN | Facility: CLINIC | Age: 38
End: 2023-03-09
Payer: COMMERCIAL

## 2023-03-09 VITALS — BODY MASS INDEX: 38.84 KG/M2 | DIASTOLIC BLOOD PRESSURE: 81 MMHG | SYSTOLIC BLOOD PRESSURE: 119 MMHG | WEIGHT: 248 LBS

## 2023-03-09 DIAGNOSIS — Z3A.37 37 WEEKS GESTATION OF PREGNANCY: ICD-10-CM

## 2023-03-09 PROCEDURE — 76816 OB US FOLLOW-UP PER FETUS: CPT

## 2023-03-09 PROCEDURE — 0502F SUBSEQUENT PRENATAL CARE: CPT

## 2023-03-09 PROCEDURE — 76819 FETAL BIOPHYS PROFIL W/O NST: CPT | Mod: 59

## 2023-03-13 ENCOUNTER — APPOINTMENT (OUTPATIENT)
Dept: OBGYN | Facility: CLINIC | Age: 38
End: 2023-03-13
Payer: COMMERCIAL

## 2023-03-13 ENCOUNTER — ASOB RESULT (OUTPATIENT)
Age: 38
End: 2023-03-13

## 2023-03-13 VITALS
BODY MASS INDEX: 38.3 KG/M2 | WEIGHT: 244 LBS | DIASTOLIC BLOOD PRESSURE: 84 MMHG | SYSTOLIC BLOOD PRESSURE: 123 MMHG | HEIGHT: 67 IN

## 2023-03-13 DIAGNOSIS — O09.519 SUPERVISION OF ELDERLY PRIMIGRAVIDA, UNSPECIFIED TRIMESTER: ICD-10-CM

## 2023-03-13 DIAGNOSIS — Z3A.38 38 WEEKS GESTATION OF PREGNANCY: ICD-10-CM

## 2023-03-13 DIAGNOSIS — Z34.93 ENCOUNTER FOR SUPERVISION OF NORMAL PREGNANCY, UNSPECIFIED, THIRD TRIMESTER: ICD-10-CM

## 2023-03-13 PROCEDURE — 0502F SUBSEQUENT PRENATAL CARE: CPT

## 2023-03-13 PROCEDURE — 76818 FETAL BIOPHYS PROFILE W/NST: CPT

## 2023-03-17 ENCOUNTER — OUTPATIENT (OUTPATIENT)
Dept: OUTPATIENT SERVICES | Facility: HOSPITAL | Age: 38
LOS: 1 days | End: 2023-03-17
Payer: COMMERCIAL

## 2023-03-17 DIAGNOSIS — Z98.890 OTHER SPECIFIED POSTPROCEDURAL STATES: Chronic | ICD-10-CM

## 2023-03-17 DIAGNOSIS — Z11.52 ENCOUNTER FOR SCREENING FOR COVID-19: ICD-10-CM

## 2023-03-17 LAB — SARS-COV-2 RNA SPEC QL NAA+PROBE: SIGNIFICANT CHANGE UP

## 2023-03-17 PROCEDURE — U0005: CPT

## 2023-03-17 PROCEDURE — U0003: CPT

## 2023-03-17 PROCEDURE — C9803: CPT

## 2023-03-19 ENCOUNTER — TRANSCRIPTION ENCOUNTER (OUTPATIENT)
Age: 38
End: 2023-03-19

## 2023-03-20 ENCOUNTER — APPOINTMENT (OUTPATIENT)
Dept: OBGYN | Facility: CLINIC | Age: 38
End: 2023-03-20

## 2023-03-20 ENCOUNTER — APPOINTMENT (OUTPATIENT)
Dept: OBGYN | Facility: HOSPITAL | Age: 38
End: 2023-03-20

## 2023-03-20 ENCOUNTER — INPATIENT (INPATIENT)
Facility: HOSPITAL | Age: 38
LOS: 2 days | Discharge: HOME | End: 2023-03-23
Attending: SPECIALIST | Admitting: SPECIALIST
Payer: COMMERCIAL

## 2023-03-20 VITALS — HEART RATE: 74 BPM | DIASTOLIC BLOOD PRESSURE: 72 MMHG | SYSTOLIC BLOOD PRESSURE: 128 MMHG

## 2023-03-20 DIAGNOSIS — Z98.890 OTHER SPECIFIED POSTPROCEDURAL STATES: Chronic | ICD-10-CM

## 2023-03-20 LAB
BLD GP AB SCN SERPL QL: NEGATIVE — SIGNIFICANT CHANGE UP
COVID-19 SPIKE DOMAIN AB INTERP: POSITIVE
COVID-19 SPIKE DOMAIN ANTIBODY RESULT: >250 U/ML — HIGH
RH IG SCN BLD-IMP: POSITIVE — SIGNIFICANT CHANGE UP
SARS-COV-2 IGG+IGM SERPL QL IA: >250 U/ML — HIGH
SARS-COV-2 IGG+IGM SERPL QL IA: POSITIVE
T PALLIDUM AB TITR SER: NEGATIVE — SIGNIFICANT CHANGE UP

## 2023-03-20 PROCEDURE — 59510 CESAREAN DELIVERY: CPT

## 2023-03-20 PROCEDURE — 59514 CESAREAN DELIVERY ONLY: CPT | Mod: 80

## 2023-03-20 RX ORDER — SODIUM CHLORIDE 9 MG/ML
1000 INJECTION, SOLUTION INTRAVENOUS ONCE
Refills: 0 | Status: COMPLETED | OUTPATIENT
Start: 2023-03-20 | End: 2023-03-20

## 2023-03-20 RX ORDER — GENTAMICIN SULFATE 40 MG/ML
400 VIAL (ML) INJECTION ONCE
Refills: 0 | Status: COMPLETED | OUTPATIENT
Start: 2023-03-20 | End: 2023-03-20

## 2023-03-20 RX ORDER — SIMETHICONE 80 MG/1
80 TABLET, CHEWABLE ORAL EVERY 4 HOURS
Refills: 0 | Status: DISCONTINUED | OUTPATIENT
Start: 2023-03-20 | End: 2023-03-23

## 2023-03-20 RX ORDER — KETOROLAC TROMETHAMINE 30 MG/ML
30 SYRINGE (ML) INJECTION EVERY 6 HOURS
Refills: 0 | Status: DISCONTINUED | OUTPATIENT
Start: 2023-03-20 | End: 2023-03-21

## 2023-03-20 RX ORDER — TETANUS TOXOID, REDUCED DIPHTHERIA TOXOID AND ACELLULAR PERTUSSIS VACCINE, ADSORBED 5; 2.5; 8; 8; 2.5 [IU]/.5ML; [IU]/.5ML; UG/.5ML; UG/.5ML; UG/.5ML
0.5 SUSPENSION INTRAMUSCULAR ONCE
Refills: 0 | Status: DISCONTINUED | OUTPATIENT
Start: 2023-03-20 | End: 2023-03-23

## 2023-03-20 RX ORDER — NALOXONE HYDROCHLORIDE 4 MG/.1ML
0.1 SPRAY NASAL
Refills: 0 | Status: DISCONTINUED | OUTPATIENT
Start: 2023-03-20 | End: 2023-03-21

## 2023-03-20 RX ORDER — HEPARIN SODIUM 5000 [USP'U]/ML
5000 INJECTION INTRAVENOUS; SUBCUTANEOUS EVERY 12 HOURS
Refills: 0 | Status: DISCONTINUED | OUTPATIENT
Start: 2023-03-20 | End: 2023-03-23

## 2023-03-20 RX ORDER — DIPHENHYDRAMINE HCL 50 MG
25 CAPSULE ORAL EVERY 4 HOURS
Refills: 0 | Status: COMPLETED | OUTPATIENT
Start: 2023-03-20 | End: 2023-03-21

## 2023-03-20 RX ORDER — OXYCODONE HYDROCHLORIDE 5 MG/1
5 TABLET ORAL
Refills: 0 | Status: DISCONTINUED | OUTPATIENT
Start: 2023-03-20 | End: 2023-03-21

## 2023-03-20 RX ORDER — LEVOTHYROXINE SODIUM 125 MCG
175 TABLET ORAL DAILY
Refills: 0 | Status: DISCONTINUED | OUTPATIENT
Start: 2023-03-20 | End: 2023-03-23

## 2023-03-20 RX ORDER — CHLORHEXIDINE GLUCONATE 213 G/1000ML
1 SOLUTION TOPICAL ONCE
Refills: 0 | Status: COMPLETED | OUTPATIENT
Start: 2023-03-20 | End: 2023-03-20

## 2023-03-20 RX ORDER — FAMOTIDINE 10 MG/ML
20 INJECTION INTRAVENOUS ONCE
Refills: 0 | Status: COMPLETED | OUTPATIENT
Start: 2023-03-20 | End: 2023-03-20

## 2023-03-20 RX ORDER — CITRIC ACID/SODIUM CITRATE 300-500 MG
15 SOLUTION, ORAL ORAL ONCE
Refills: 0 | Status: COMPLETED | OUTPATIENT
Start: 2023-03-20 | End: 2023-03-20

## 2023-03-20 RX ORDER — OXYTOCIN 10 UNIT/ML
333.33 VIAL (ML) INJECTION
Qty: 20 | Refills: 0 | Status: DISCONTINUED | OUTPATIENT
Start: 2023-03-20 | End: 2023-03-23

## 2023-03-20 RX ORDER — SODIUM CHLORIDE 9 MG/ML
1000 INJECTION, SOLUTION INTRAVENOUS
Refills: 0 | Status: DISCONTINUED | OUTPATIENT
Start: 2023-03-20 | End: 2023-03-23

## 2023-03-20 RX ORDER — GENTAMICIN SULFATE 40 MG/ML
480 VIAL (ML) INJECTION ONCE
Refills: 0 | Status: DISCONTINUED | OUTPATIENT
Start: 2023-03-20 | End: 2023-03-20

## 2023-03-20 RX ORDER — ONDANSETRON 8 MG/1
4 TABLET, FILM COATED ORAL EVERY 6 HOURS
Refills: 0 | Status: DISCONTINUED | OUTPATIENT
Start: 2023-03-20 | End: 2023-03-21

## 2023-03-20 RX ORDER — DEXAMETHASONE 0.5 MG/5ML
4 ELIXIR ORAL EVERY 6 HOURS
Refills: 0 | Status: DISCONTINUED | OUTPATIENT
Start: 2023-03-20 | End: 2023-03-21

## 2023-03-20 RX ORDER — DIPHENHYDRAMINE HCL 50 MG
25 CAPSULE ORAL EVERY 6 HOURS
Refills: 0 | Status: COMPLETED | OUTPATIENT
Start: 2023-03-20 | End: 2024-02-16

## 2023-03-20 RX ORDER — LANOLIN
1 OINTMENT (GRAM) TOPICAL EVERY 6 HOURS
Refills: 0 | Status: DISCONTINUED | OUTPATIENT
Start: 2023-03-20 | End: 2023-03-23

## 2023-03-20 RX ORDER — SODIUM CHLORIDE 9 MG/ML
1000 INJECTION, SOLUTION INTRAVENOUS
Refills: 0 | Status: DISCONTINUED | OUTPATIENT
Start: 2023-03-20 | End: 2023-03-20

## 2023-03-20 RX ORDER — OXYCODONE HYDROCHLORIDE 5 MG/1
5 TABLET ORAL
Refills: 0 | Status: DISCONTINUED | OUTPATIENT
Start: 2023-03-20 | End: 2023-03-23

## 2023-03-20 RX ORDER — MAGNESIUM HYDROXIDE 400 MG/1
30 TABLET, CHEWABLE ORAL
Refills: 0 | Status: DISCONTINUED | OUTPATIENT
Start: 2023-03-20 | End: 2023-03-23

## 2023-03-20 RX ORDER — IBUPROFEN 200 MG
600 TABLET ORAL EVERY 6 HOURS
Refills: 0 | Status: COMPLETED | OUTPATIENT
Start: 2023-03-20 | End: 2024-02-16

## 2023-03-20 RX ORDER — ACETAMINOPHEN 500 MG
975 TABLET ORAL
Refills: 0 | Status: DISCONTINUED | OUTPATIENT
Start: 2023-03-20 | End: 2023-03-23

## 2023-03-20 RX ORDER — OXYCODONE HYDROCHLORIDE 5 MG/1
10 TABLET ORAL
Refills: 0 | Status: DISCONTINUED | OUTPATIENT
Start: 2023-03-20 | End: 2023-03-21

## 2023-03-20 RX ORDER — MORPHINE SULFATE 50 MG/1
0.1 CAPSULE, EXTENDED RELEASE ORAL ONCE
Refills: 0 | Status: DISCONTINUED | OUTPATIENT
Start: 2023-03-20 | End: 2023-03-21

## 2023-03-20 RX ORDER — NALBUPHINE HYDROCHLORIDE 10 MG/ML
2.5 INJECTION, SOLUTION INTRAMUSCULAR; INTRAVENOUS; SUBCUTANEOUS EVERY 6 HOURS
Refills: 0 | Status: COMPLETED | OUTPATIENT
Start: 2023-03-20 | End: 2023-03-21

## 2023-03-20 RX ADMIN — Medication 975 MILLIGRAM(S): at 22:13

## 2023-03-20 RX ADMIN — Medication 30 MILLIGRAM(S): at 23:49

## 2023-03-20 RX ADMIN — CHLORHEXIDINE GLUCONATE 1 APPLICATION(S): 213 SOLUTION TOPICAL at 15:58

## 2023-03-20 RX ADMIN — Medication 300 MILLIGRAM(S): at 17:30

## 2023-03-20 RX ADMIN — FAMOTIDINE 20 MILLIGRAM(S): 10 INJECTION INTRAVENOUS at 15:59

## 2023-03-20 RX ADMIN — NALBUPHINE HYDROCHLORIDE 2.5 MILLIGRAM(S): 10 INJECTION, SOLUTION INTRAMUSCULAR; INTRAVENOUS; SUBCUTANEOUS at 23:49

## 2023-03-20 RX ADMIN — Medication 15 MILLILITER(S): at 15:59

## 2023-03-20 RX ADMIN — Medication 975 MILLIGRAM(S): at 23:00

## 2023-03-20 RX ADMIN — SODIUM CHLORIDE 2000 MILLILITER(S): 9 INJECTION, SOLUTION INTRAVENOUS at 15:58

## 2023-03-20 RX ADMIN — Medication 100 MILLIGRAM(S): at 17:08

## 2023-03-20 NOTE — OB PROVIDER DELIVERY SUMMARY - NSSELHIDDEN_OBGYN_ALL_OB_FT
[NS_DeliveryAttending1_OBGYN_ALL_OB_FT:[id number]],[NS_DeliveryAttending2_OBGYN_ALL_OB_FT:[id number]],[NS_DeliveryRN_OBGYN_ALL_OB_FT:[id number]]

## 2023-03-20 NOTE — OB RN DELIVERY SUMMARY - NS_SEPSISRSKCALC_OBGYN_ALL_OB_FT
No temperature has been documented for this patient in CPN or on the OB Flowsheet. Ensure the highest temperature during labor was documented on the OB Flowsheet.  Rupture of membranes must be entered above.

## 2023-03-20 NOTE — OB RN PATIENT PROFILE - PRESSURE ULCER(S)
[facility]  06/09/20    Patient: Agatha Canas  YOB: 1970  Medical Record Number: [id number]                                                                  Opioid / Opioid Plus Controlled Substance Agreement    I understand that my care provider has prescribed an opioid (narcotic) controlled substance to help manage my condition(s). I am taking this medicine to help me function or work. I know this is strong medicine, and that it can cause serious side effects. Opioid medicine can be sedating, addicting and may cause a dependency on the drug. They can affect my ability to drive or think, and cause depression. They need to be taken exactly as prescribed. Combining opioids with certain medicines or chemicals (such as cocaine, sedatives and tranquilizers, sleeping pills, meth) can be dangerous or even fatal. Also, if I stop opioids suddenly, I may have severe withdrawal symptoms. Last, I understand that opioids do not work for all types of pain nor for all patients. If not helpful, I may be asked to stop them.        The risks, benefits, and side effects of these medicine(s) were explained to me. I agree that:    1. I will take part in other treatments as advised by my care team. This may be psychiatry or counseling, physical therapy, behavioral therapy, group treatment or a referral to a pain clinic. I will reduce or stop my medicine when my care team tells me to do so.  2. I will take my medicines as prescribed. I will not change the dose or schedule unless my care team tells me to. There will be no refills if I  run out early.   I may be contactedwithout warning and asked to complete a urine drug test or pill count at any time.   3. I will keep all my appointments, and understand this is part of the monitoring of opioids. My care team may require an office visit for EVERY opioid/controlled substance refill. If I miss appointments or don t follow instructions, my care team may  stop my medicine.  4. I will not ask other providers to prescribe controlled substances, and I will not accept controlled substances from other people. If I need another prescribed controlled substance for a new reason, I will tell my care team within 1 business day.  5. I will use one pharmacy to fill all of my controlled substance prescriptions, and it is up to me to make sure that I do not run out of my medicines on weekends or holidays. If my care team is willing to refill my opioid prescription without a visit, I must request refills only during office hours, refills may take up to 3 days to process, and it may take up to 5 to 7 days for my medicine to be mailed and ready at my pharmacy. Prescriptions will not be mailed anywhere except my pharmacy.        [id number]  Rev 12/18         Registration to scan to EHR                             Page 1 of 2               Controlled Substance Agreement Opioid        [facility]  06/09/20  Patient: Agatha Canas  YOB: 1970  Medical Record Number: [id number]                                                                  6. I am responsible for my prescriptions. If the medicine/prescription is lost or stolen, it will not be replaced. I also agree not to share controlled substance medicines with anyone.  7. I agree to not use ANY illegal or recreational drugs. This includes marijuana, cocaine, bath salts or other drugs. I agree not to use alcohol unless my care team says I may.          I agree to give urine samples whenever asked. If I don t give a urine sample, the care team may stop my medicine.    8. If I enroll in the [place] Medical Marijuana program, I will tell my care team. I will also sign an agreement to share my medical records with my care team.   9. I will bring in my list of medicines (or my medicine bottles) each time I come to the clinic.   10. I will tell my care team right away if I become pregnant or have a new medical  problem treated outside of my regular clinic.  11. I understand that this medicine can affect my thinking and judgment. It may be unsafe for me to drive, use machinery and do dangerous tasks. I will not do any of these things until I know how the medicine affects me. If my dose changes, I will wait to see how it affects me. I will contact my care team if I have concerns about medicine side effects.    I understand that if I do not follow any of the conditions above, my prescriptions or treatment may be stopped.      I agree that my provider, clinic care team, and pharmacy may work with any city, state or federal law enforcement agency that investigates the misuse, sale, or other diversion of my controlled medicine. I will allow my provider to discuss my care with or share a copy of this agreement with any other treating provider, pharmacy or emergency room where I receive care. I agree to give up (waive) any right of privacy or confidentiality with respect to these consents.     I have read this agreement and have asked questions about anything I did not understand.      ________________________________________________________________________  Patient signature - Date/Time -  Agatha Canas                                      ________________________________________________________________________  Witness signature                                                            ________________________________________________________________________  Provider signature - Dipti Kasper MD      [id number]  Rev 12/18         Registration to scan to EHR                         Page 2 of 2                   Controlled Substance Agreement Opioid           Page 1 of 2  Opioid Pain Medicines (also known as Narcotics)  What You Need to Know    What are opioids?   Opioids are pain medicines that must be prescribed by a doctor.  They are also known as narcotics.    Examples are:     morphine (MS Contin, Izzy)    oxycodone  (Oxycontin)    oxycodone and acetaminophen (Percocet)    hydrocodone and acetaminophen (Vicodin, Norco)     fentanyl patch (Duragesic)     hydromorphone (Dilaudid)     methadone     What do opioids do well?   Opioids are best for short-term pain after a surgery or injury. They also work well for cancer pain. Unlike other pain medicines, they do not cause liver or kidney failure or ulcers. They may help some people with long-lasting (chronic) pain.     What do opioids NOT do well?   Opioids never get rid of pain entirely, and they do not work well for most patients with chronic pain. Opioids do not reduce swelling, one of the causes of pain. They also don t work well for nerve pain.                           For informational purposes only.  Not to replace the advice of your care provider.  Copyright 201 [facility]. All right reserved. Remitly 446706-Qpj 02/18.      Page 2 of 2    Risks and side effects   Talk to your doctor before you start or decide to keep taking one of these medicines. Side effects include:    Lowering your breathing rate enough to cause death    Overdose, including death, especially if taking higher than prescribed doses    Long-term opioid use    Worse depression symptoms; less pleasure in things you usually enjoy    Feeling tired or sluggish    Slower thoughts or cloudy thinking    Being more sensitive to pain over time; pain is harder to control    Trouble sleeping or restless sleep    Changes in hormone levels (for example, less testosterone)    Changes in sex drive or ability to have sex    Constipation    Unsafe driving    Itching and sweating    Feeling dizzy    Nausea, vomiting and dry mouth    What else should I know about opioids?  When someone takes opioids for too long or too often, they become dependent. This means that if you stop or reduce the medicine too quickly, you will have withdrawal symptoms.    Dependence is not the same as addiction. Addiction is when  people keep using a substance that harms their body, their mind or their relations with others. If you have a history of drug or alcohol abuse, taking opioids can cause a relapse.    Over time, opioids don t work as well. Most people will need higher and higher doses. The higher the dose, the more serious the side effects. We don t know the long-term effects of opioids.      Prescribed opioids aren't the best way to manage chronic pain    Other ways to manage pain include:      Ibuprofen or acetaminophen.  You should always try this first.      Treat health problems that may be causing pain.      acupuncture or massage, deep breathing, meditation, visual imagery, aromatherapy.      Use heat or ice at the pain site      Physical therapy and exercise      Stop smoking      See a counselor or therapist                                                  People who have used opioids for a long time may have a lower quality of life, worse depression, higher levels of pain and more visits to doctors.    Never share your opioids with others. Be sure to store opioids in a secure place, locked if possible.Young children can easily swallow them and overdose.     You can overdose on opioids.  Signs of overdose include decrease or loss of consciousness, slowed breathing, trouble waking and blue lips.  If someone is worried about overdose, they should call 911.    If you are at risk for overdose, you may get naloxone (Narcan, a medicine that reverses the effects of opioids.  If you overdose, a friend or family member can give you Narcan while waiting for the ambulance.  They need to know the signs of overdose and how to give Narcan.    While you're taking opioids:    Don't use alcohol or street drugs. Taking them together can cause death.    Don't take any of these medicines unless your doctor says its okay.  Taking these with opioids can cause death.    Benzodiazepines (such as lorazepam         or diazepam)    Muscle relaxers  (such as cyclobenzaprine)    sleeping pills    other opioids    Safe disposal of opioids  Find your area drug take-back program, your pharmacy mail-back program, buy a special disposal bag (such as Deterra) from your pharmacy or flush them down the toilet.  Use the guidelines at:  www.fda.gov/drugs/resourcesforyou      no

## 2023-03-20 NOTE — OB PROVIDER H&P - HISTORY OF PRESENT ILLNESS
R2 Admission H&P    Subjective  HPI: 37yoF  @39w3d presents for scheduled primary elective CS. Patient is s/p ECV on 3/7 from alexia breech to vertex presentation.   +FM   -LOF   -CTXs   -VB. Pt denies any other concerns.    GBS   EFW       g by sono    PNC: Denies prenatal issues.   ObHx: Primigravida   GynHx: Denies hx of fibroids, ovarian cysts, abnml PAP smears, STIs  MedHx: Denies hx of HTN, DM, asthma, thyroid problems, blood clots/bleeding problems, hx of blood transfusions  Meds: PNV  All: NKDA  PSHx: Denies  FHx: Denies hx of blood clots/bleeding problems  Social: Denies alcohol/tobacco/drug use in pregnancy  Psych: Denies hx of anxiety/depression     – Will accept blood transfusions? Yes      Objective  – VS  T(C): --  HR: 74 (23 @ 15:28)  BP: 128/72 (23 @ 15:28)  RR: --  SpO2: --    – PE:   CV: RRR  Pulm: breathing comfortably on RA  Abd: gravid, nontender  Extr: moving all extremities with ease    Assessment   36 yo F   @39w3d  presents for scheduled primary elective CS .     Plan  - Admit to L&D  - Routine labs, IVF, NPO  - Reglan/Pepcid/Bicitra  - Abdominal prep, PAS, wallace to bedside drainage  - EFM: continue to monitor  - GBS: neg  - Anesthesia consult  - COVID neg      Plan per attending physician, Dr. Fabricio Mistry-Kavya, PGY2 R2 Admission H&P    Subjective  HPI: 37yoF  @39w3d presents for scheduled primary elective CS. Patient is s/p ECV on 3/7 from alexia breech to vertex presentation, however patient is electing for primary CS.   +FM   -LOF   -CTXs   -VB. Pt denies any other concerns.    GBS neg    PNC:  breech s/p ECV on 3/7, polyhydraminos-resolved (GDM testing x2, negative).   ObHx: missed AB w/ D&Cx1  GynHx: LSC ovarian cystectomy, Denies hx of fibroids, abnml PAP smears, STIs  MedHx: Hypothyroid.  Denies hx of HTN, DM, asthma, blood clots/bleeding problems, hx of blood transfusions  Meds: PNV, Synthroid   All: - Penicillin -> hives   - Azithromycin + Cefzil -> swelling   PSHx: LSC cystectomy, D&Cx1  Fhx: sister with thrombophilia (pt was told that prophylaxis was not indicated ).   Social: Denies alcohol/tobacco/drug use in pregnancy  Psych: Denies hx of anxiety/depression     – Will accept blood transfusions? Yes      Objective  – VS  T(C): --  HR: 74 (23 @ 15:28)  BP: 128/72 (23 @ 15:28)  RR: --  SpO2: --    – PE:   CV: RRR  Pulm: breathing comfortably on RA  Abd: gravid, nontender  Extr: moving all extremities with ease    FHT: 135 +accels -decels mod variability  TOCO: quiet     Sono: vertex, posterior placenta         Assessment   38 yo F   @39w3d  presents for scheduled primary elective CS .     Plan  - Admit to L&D  - Routine labs, IVF, NPO  - Reglan/Pepcid/Bicitra  - Abdominal prep, PAS, wallace to bedside drainage  - EFM: continue to monitor  - GBS: neg  - Anesthesia consult  - COVID neg    Plan per attending physician, Dr. Fabricio Mistry-Kavya, PGY2

## 2023-03-20 NOTE — OB RN DELIVERY SUMMARY - NSSELHIDDEN_OBGYN_ALL_OB_FT
[NS_[email or web address]_ALL_OB_FT:[id number]],[NS_DeliveryAttending2_OBGYN_ALL_OB_FT:[id number]],[NS_DeliveryRN_OBGYN_ALL_OB_FT:[id number]]

## 2023-03-20 NOTE — OB RN INTRAOPERATIVE NOTE - NSSELHIDDEN_OBGYN_ALL_OB_FT
[NS_DeliveryAttending1_OBGYN_ALL_OB_FT:[id number]],[NS_DeliveryAttending2_OBGYN_ALL_OB_FT:[id number]],[NS_DeliveryRN_OBGYN_ALL_OB_FT:[id number]]

## 2023-03-20 NOTE — PRE-ANESTHESIA EVALUATION ADULT - NSANTHPEFT_GEN_ALL_CORE
Gen: alert and oriented x3, no distress  JVP: Normal  Neck: supple  Lung: clear   CV: S1 S2   Abd: soft  Ext: No edema  neuro: Awake / alert  Psych: flat affect  Skin: normal

## 2023-03-20 NOTE — OB PROVIDER H&P - NSLOWPPHRISK_OBGYN_A_OB
No previous uterine incision/[place] Pregnancy/Less than or equal to 4 previous vaginal births/No known bleeding disorder

## 2023-03-20 NOTE — OB RN PATIENT PROFILE - FALL HARM RISK - UNIVERSAL INTERVENTIONS
Bed in lowest position, wheels locked, appropriate side rails in place/Call bell, personal items and telephone in reach/Instruct patient to call for assistance before getting out of bed or chair/Non-slip footwear when patient is out of bed/[place] to call system/Physically safe environment - no spills, clutter or unnecessary equipment/Purposeful Proactive Rounding/Room/bathroom lighting operational, light cord in reach

## 2023-03-20 NOTE — OB RN PATIENT PROFILE - PATIENT REPRESENTATIVE: ( YOU CAN CHOOSE ANY PERSON THAT CAN ASSIST YOU WITH YOUR HEALTH CARE PREFERENCES, DOES NOT HAVE TO BE A SPOUSE, IMMEDIATE FAMILY OR SIGNIFICANT OTHER/PARTNER)
Patient states he \"feels much better\". Patient insisting on discharging home. Patient & spouse given discharge instructions & verbalized understanding. Patient requests to keep large jennings bag on for home. Leg bag sent with patient. Catheter marked for patient's wife to remove on Monday per instructions. Patient & wife have dealt with home jennings catheter prior & was given reinforced teaching     Dr. Macy Libman called & updated on patient status. Okay to discharge home per order. Yes

## 2023-03-21 LAB
BASOPHILS # BLD AUTO: 0.04 K/UL — SIGNIFICANT CHANGE UP (ref 0–0.2)
BASOPHILS NFR BLD AUTO: 0.2 % — SIGNIFICANT CHANGE UP (ref 0–2)
EOSINOPHIL # BLD AUTO: 0.05 K/UL — SIGNIFICANT CHANGE UP (ref 0–0.5)
EOSINOPHIL NFR BLD AUTO: 0.3 % — SIGNIFICANT CHANGE UP (ref 0–6)
HCT VFR BLD CALC: 32.5 % — LOW (ref 34.5–45)
HGB BLD-MCNC: 10.6 G/DL — LOW (ref 11.5–15.5)
IMM GRANULOCYTES NFR BLD AUTO: 1.3 % — HIGH (ref 0–0.9)
LYMPHOCYTES # BLD AUTO: 1.27 K/UL — SIGNIFICANT CHANGE UP (ref 1–3.3)
LYMPHOCYTES # BLD AUTO: 6.5 % — LOW (ref 13–44)
MCHC RBC-ENTMCNC: 31.5 PG — SIGNIFICANT CHANGE UP (ref 27–34)
MCHC RBC-ENTMCNC: 32.6 GM/DL — SIGNIFICANT CHANGE UP (ref 32–36)
MCV RBC AUTO: 96.4 FL — SIGNIFICANT CHANGE UP (ref 80–100)
MONOCYTES # BLD AUTO: 1.4 K/UL — HIGH (ref 0–0.9)
MONOCYTES NFR BLD AUTO: 7.1 % — SIGNIFICANT CHANGE UP (ref 2–14)
NEUTROPHILS # BLD AUTO: 16.61 K/UL — HIGH (ref 1.8–7.4)
NEUTROPHILS NFR BLD AUTO: 84.6 % — HIGH (ref 43–77)
NRBC # BLD: 0 /100 WBCS — SIGNIFICANT CHANGE UP (ref 0–0)
PLATELET # BLD AUTO: 189 K/UL — SIGNIFICANT CHANGE UP (ref 150–400)
RBC # BLD: 3.37 M/UL — LOW (ref 3.8–5.2)
RBC # FLD: 14.1 % — SIGNIFICANT CHANGE UP (ref 10.3–14.5)
WBC # BLD: 19.63 K/UL — HIGH (ref 3.8–10.5)
WBC # FLD AUTO: 19.63 K/UL — HIGH (ref 3.8–10.5)

## 2023-03-21 RX ORDER — DIPHENHYDRAMINE HCL 50 MG
25 CAPSULE ORAL EVERY 6 HOURS
Refills: 0 | Status: DISCONTINUED | OUTPATIENT
Start: 2023-03-21 | End: 2023-03-23

## 2023-03-21 RX ORDER — IBUPROFEN 200 MG
600 TABLET ORAL EVERY 6 HOURS
Refills: 0 | Status: DISCONTINUED | OUTPATIENT
Start: 2023-03-21 | End: 2023-03-23

## 2023-03-21 RX ADMIN — Medication 975 MILLIGRAM(S): at 04:20

## 2023-03-21 RX ADMIN — Medication 30 MILLIGRAM(S): at 12:22

## 2023-03-21 RX ADMIN — Medication 975 MILLIGRAM(S): at 09:40

## 2023-03-21 RX ADMIN — HEPARIN SODIUM 5000 UNIT(S): 5000 INJECTION INTRAVENOUS; SUBCUTANEOUS at 17:45

## 2023-03-21 RX ADMIN — Medication 30 MILLIGRAM(S): at 17:45

## 2023-03-21 RX ADMIN — Medication 975 MILLIGRAM(S): at 21:02

## 2023-03-21 RX ADMIN — Medication 30 MILLIGRAM(S): at 08:00

## 2023-03-21 RX ADMIN — NALBUPHINE HYDROCHLORIDE 2.5 MILLIGRAM(S): 10 INJECTION, SOLUTION INTRAMUSCULAR; INTRAVENOUS; SUBCUTANEOUS at 08:00

## 2023-03-21 RX ADMIN — HEPARIN SODIUM 5000 UNIT(S): 5000 INJECTION INTRAVENOUS; SUBCUTANEOUS at 06:21

## 2023-03-21 RX ADMIN — Medication 975 MILLIGRAM(S): at 09:08

## 2023-03-21 RX ADMIN — Medication 175 MICROGRAM(S): at 06:22

## 2023-03-21 RX ADMIN — Medication 975 MILLIGRAM(S): at 03:44

## 2023-03-21 RX ADMIN — NALBUPHINE HYDROCHLORIDE 2.5 MILLIGRAM(S): 10 INJECTION, SOLUTION INTRAMUSCULAR; INTRAVENOUS; SUBCUTANEOUS at 00:30

## 2023-03-21 RX ADMIN — Medication 30 MILLIGRAM(S): at 07:25

## 2023-03-21 RX ADMIN — Medication 975 MILLIGRAM(S): at 21:45

## 2023-03-21 RX ADMIN — NALBUPHINE HYDROCHLORIDE 2.5 MILLIGRAM(S): 10 INJECTION, SOLUTION INTRAMUSCULAR; INTRAVENOUS; SUBCUTANEOUS at 12:39

## 2023-03-21 RX ADMIN — Medication 30 MILLIGRAM(S): at 18:20

## 2023-03-21 RX ADMIN — Medication 25 MILLIGRAM(S): at 21:02

## 2023-03-21 RX ADMIN — Medication 30 MILLIGRAM(S): at 00:30

## 2023-03-21 RX ADMIN — NALBUPHINE HYDROCHLORIDE 2.5 MILLIGRAM(S): 10 INJECTION, SOLUTION INTRAMUSCULAR; INTRAVENOUS; SUBCUTANEOUS at 07:19

## 2023-03-21 RX ADMIN — Medication 30 MILLIGRAM(S): at 13:00

## 2023-03-21 RX ADMIN — Medication 25 MILLIGRAM(S): at 15:17

## 2023-03-21 RX ADMIN — NALBUPHINE HYDROCHLORIDE 2.5 MILLIGRAM(S): 10 INJECTION, SOLUTION INTRAMUSCULAR; INTRAVENOUS; SUBCUTANEOUS at 13:00

## 2023-03-21 NOTE — PROGRESS NOTE ADULT - SUBJECTIVE AND OBJECTIVE BOX
Day 1 of Anesthesia Pain Management Service    SUBJECTIVE: Doing ok  Pain Scale Score:          [X] Refer to charted pain scores    THERAPY:    s/p    100 mcg PF morphine on 3\20\2023       MEDICATIONS  (STANDING):  acetaminophen     Tablet .. 975 milliGRAM(s) Oral <User Schedule>  diphtheria/tetanus/pertussis (acellular) Vaccine (Adacel) 0.5 milliLiter(s) IntraMuscular once  heparin   Injectable 5000 Unit(s) SubCutaneous every 12 hours  ibuprofen  Tablet. 600 milliGRAM(s) Oral every 6 hours  ketorolac   Injectable 30 milliGRAM(s) IV Push every 6 hours  lactated ringers. 1000 milliLiter(s) (125 mL/Hr) IV Continuous <Continuous>  levothyroxine 175 MICROGram(s) Oral daily  morphine PF Spinal 0.1 milliGRAM(s) IntraThecal. once  oxytocin Infusion 333.333 milliUNIT(s)/Min (1000 mL/Hr) IV Continuous <Continuous>  oxytocin Infusion 333.333 milliUNIT(s)/Min (1000 mL/Hr) IV Continuous <Continuous>    MEDICATIONS  (PRN):  dexAMETHasone  Injectable 4 milliGRAM(s) IV Push every 6 hours PRN Nausea  diphenhydrAMINE 25 milliGRAM(s) Oral every 6 hours PRN Pruritus  diphenhydrAMINE Injectable 25 milliGRAM(s) IV Push every 4 hours PRN Pruritus  lanolin Ointment 1 Application(s) Topical every 6 hours PRN Sore Nipples  magnesium hydroxide Suspension 30 milliLiter(s) Oral two times a day PRN Constipation  nalbuphine Injectable 2.5 milliGRAM(s) IV Push every 6 hours PRN Pruritus  naloxone Injectable 0.1 milliGRAM(s) IV Push every 3 minutes PRN For ANY of the following changes in patient status:  A. Breaths Per Minute LESS THAN 10, B. Oxygen saturation LESS THAN 90%, C. Sedation score of 6 for Stop After: 4 Times  ondansetron Injectable 4 milliGRAM(s) IV Push every 6 hours PRN Nausea  oxyCODONE    IR 5 milliGRAM(s) Oral every 3 hours PRN Moderate to Severe Pain (4-10)  oxyCODONE    IR 5 milliGRAM(s) Oral every 3 hours PRN Mild Pain (1 - 3)  oxyCODONE    IR 10 milliGRAM(s) Oral every 3 hours PRN Moderate Pain (4 - 6)  simethicone 80 milliGRAM(s) Chew every 4 hours PRN Gas      OBJECTIVE:    Sedation:        	[X] Alert	 [ ] Drowsy	[ ] Arousable      [ ] Asleep       [ ] Unresponsive    Side Effects:	[ ] None 	[ ] Nausea	[ ] Vomiting         [X ] Pruritus: Nubain prn  		[ ] Weakness            [ ] Numbness	          [ ] Other:    Vital Signs Last 24 Hrs  T(C): 36.8 (21 Mar 2023 09:16), Max: 36.8 (21 Mar 2023 09:16)  T(F): 98.3 (21 Mar 2023 09:16), Max: 98.3 (21 Mar 2023 09:16)  HR: 83 (21 Mar 2023 09:16) (70 - 90)  BP: 128/82 (21 Mar 2023 09:16) (107/57 - 133/71)  BP(mean): 84 (20 Mar 2023 19:55) (76 - 93)  RR: 18 (21 Mar 2023 09:16) (18 - 18)  SpO2: 96% (21 Mar 2023 09:16) (95% - 99%)    Parameters below as of 21 Mar 2023 09:16  Patient On (Oxygen Delivery Method): room air        ASSESSMENT/ PLAN  [X] Patient to be transitioned to prn analgesics after 24 hours  [X] Pain management per primary service, pain service to sign off   [X]Documentation and Verification of current medications

## 2023-03-21 NOTE — PROGRESS NOTE ADULT - SUBJECTIVE AND OBJECTIVE BOX
OB Postpartum Note:  Delivery    S: 38yo now POD #1 s/p elective pLTCS. Her pain is well controlled. She is tolerating a regular diet but has not yet passed flatus. Voiding spontaneously and ambulating without difficulty. Denies N/V. Denies CP/SOB/lightheadedness/dizziness/headache/epigastric pain/changes in vision.    O:   Vitals:  Vital Signs Last 24 Hrs  T(C): 36.6 (21 Mar 2023 00:52), Max: 36.6 (21 Mar 2023 00:52)  T(F): 97.9 (21 Mar 2023 00:52), Max: 97.9 (21 Mar 2023 00:52)  HR: 70 (20 Mar 2023 21:45) (70 - 90)  BP: 119/76 (20 Mar 2023 21:45) (107/57 - 133/71)  BP(mean): 84 (20 Mar 2023 19:55) (76 - 93)  RR: 18 (21 Mar 2023 00:52) (18 - 18)  SpO2: 95% (21 Mar 2023 00:52) (95% - 99%)    Parameters below as of 21 Mar 2023 00:52  Patient On (Oxygen Delivery Method): room air        MEDICATIONS  (STANDING):  acetaminophen     Tablet .. 975 milliGRAM(s) Oral <User Schedule>  diphtheria/tetanus/pertussis (acellular) Vaccine (Adacel) 0.5 milliLiter(s) IntraMuscular once  heparin   Injectable 5000 Unit(s) SubCutaneous every 12 hours  ibuprofen  Tablet. 600 milliGRAM(s) Oral every 6 hours  ketorolac   Injectable 30 milliGRAM(s) IV Push every 6 hours  lactated ringers. 1000 milliLiter(s) (125 mL/Hr) IV Continuous <Continuous>  levothyroxine 175 MICROGram(s) Oral daily  morphine PF Spinal 0.1 milliGRAM(s) IntraThecal. once  oxytocin Infusion 333.333 milliUNIT(s)/Min (1000 mL/Hr) IV Continuous <Continuous>  oxytocin Infusion 333.333 milliUNIT(s)/Min (1000 mL/Hr) IV Continuous <Continuous>    MEDICATIONS  (PRN):  dexAMETHasone  Injectable 4 milliGRAM(s) IV Push every 6 hours PRN Nausea  diphenhydrAMINE 25 milliGRAM(s) Oral every 6 hours PRN Pruritus  diphenhydrAMINE Injectable 25 milliGRAM(s) IV Push every 4 hours PRN Pruritus  lanolin Ointment 1 Application(s) Topical every 6 hours PRN Sore Nipples  magnesium hydroxide Suspension 30 milliLiter(s) Oral two times a day PRN Constipation  nalbuphine Injectable 2.5 milliGRAM(s) IV Push every 6 hours PRN Pruritus  naloxone Injectable 0.1 milliGRAM(s) IV Push every 3 minutes PRN For ANY of the following changes in patient status:  A. Breaths Per Minute LESS THAN 10, B. Oxygen saturation LESS THAN 90%, C. Sedation score of 6 for Stop After: 4 Times  ondansetron Injectable 4 milliGRAM(s) IV Push every 6 hours PRN Nausea  oxyCODONE    IR 5 milliGRAM(s) Oral every 3 hours PRN Moderate to Severe Pain (4-10)  oxyCODONE    IR 5 milliGRAM(s) Oral every 3 hours PRN Mild Pain (1 - 3)  oxyCODONE    IR 10 milliGRAM(s) Oral every 3 hours PRN Moderate Pain (4 - 6)  simethicone 80 milliGRAM(s) Chew every 4 hours PRN Gas      Labs:  Blood type: A Positive  Rubella IgG: RPR: Negative                    PE:  General: NAD, patient resting comfortably in bed  Abdomen: Mildly distended, appropriately tender. Fundus firm.  Incision: Clean, dry, intact.  : appropriate amount of lochia on pad  Extremities: SCDs in place, no erythema     OB Postpartum Note:  Delivery    S: 36yo now POD #1 s/p elective pLTCS. Her pain is well controlled. She is tolerating a regular and has passed flatus. Voiding spontaneously and ambulating without difficulty. Denies N/V. Denies CP/SOB/lightheadedness/dizziness/headache/epigastric pain/changes in vision.    O:   Vitals:  Vital Signs Last 24 Hrs  T(C): 36.6 (21 Mar 2023 00:52), Max: 36.6 (21 Mar 2023 00:52)  T(F): 97.9 (21 Mar 2023 00:52), Max: 97.9 (21 Mar 2023 00:52)  HR: 70 (20 Mar 2023 21:45) (70 - 90)  BP: 119/76 (20 Mar 2023 21:45) (107/57 - 133/71)  BP(mean): 84 (20 Mar 2023 19:55) (76 - 93)  RR: 18 (21 Mar 2023 00:52) (18 - 18)  SpO2: 95% (21 Mar 2023 00:52) (95% - 99%)    Parameters below as of 21 Mar 2023 00:52  Patient On (Oxygen Delivery Method): room air        MEDICATIONS  (STANDING):  acetaminophen     Tablet .. 975 milliGRAM(s) Oral <User Schedule>  diphtheria/tetanus/pertussis (acellular) Vaccine (Adacel) 0.5 milliLiter(s) IntraMuscular once  heparin   Injectable 5000 Unit(s) SubCutaneous every 12 hours  ibuprofen  Tablet. 600 milliGRAM(s) Oral every 6 hours  ketorolac   Injectable 30 milliGRAM(s) IV Push every 6 hours  lactated ringers. 1000 milliLiter(s) (125 mL/Hr) IV Continuous <Continuous>  levothyroxine 175 MICROGram(s) Oral daily  morphine PF Spinal 0.1 milliGRAM(s) IntraThecal. once  oxytocin Infusion 333.333 milliUNIT(s)/Min (1000 mL/Hr) IV Continuous <Continuous>  oxytocin Infusion 333.333 milliUNIT(s)/Min (1000 mL/Hr) IV Continuous <Continuous>    MEDICATIONS  (PRN):  dexAMETHasone  Injectable 4 milliGRAM(s) IV Push every 6 hours PRN Nausea  diphenhydrAMINE 25 milliGRAM(s) Oral every 6 hours PRN Pruritus  diphenhydrAMINE Injectable 25 milliGRAM(s) IV Push every 4 hours PRN Pruritus  lanolin Ointment 1 Application(s) Topical every 6 hours PRN Sore Nipples  magnesium hydroxide Suspension 30 milliLiter(s) Oral two times a day PRN Constipation  nalbuphine Injectable 2.5 milliGRAM(s) IV Push every 6 hours PRN Pruritus  naloxone Injectable 0.1 milliGRAM(s) IV Push every 3 minutes PRN For ANY of the following changes in patient status:  A. Breaths Per Minute LESS THAN 10, B. Oxygen saturation LESS THAN 90%, C. Sedation score of 6 for Stop After: 4 Times  ondansetron Injectable 4 milliGRAM(s) IV Push every 6 hours PRN Nausea  oxyCODONE    IR 5 milliGRAM(s) Oral every 3 hours PRN Moderate to Severe Pain (4-10)  oxyCODONE    IR 5 milliGRAM(s) Oral every 3 hours PRN Mild Pain (1 - 3)  oxyCODONE    IR 10 milliGRAM(s) Oral every 3 hours PRN Moderate Pain (4 - 6)  simethicone 80 milliGRAM(s) Chew every 4 hours PRN Gas      Labs:  Blood type: A Positive  Rubella IgG: RPR: Negative                    PE:  General: NAD, patient resting comfortably in bed  Abdomen: Mildly distended, appropriately tender. Fundus firm.  Incision: Clean, dry, intact.  : appropriate amount of lochia on pad  Extremities: SCDs in place, no erythema

## 2023-03-22 ENCOUNTER — TRANSCRIPTION ENCOUNTER (OUTPATIENT)
Age: 38
End: 2023-03-22

## 2023-03-22 RX ORDER — OXYCODONE HYDROCHLORIDE 5 MG/1
1 TABLET ORAL
Qty: 10 | Refills: 0
Start: 2023-03-22

## 2023-03-22 RX ORDER — ACETAMINOPHEN 500 MG
3 TABLET ORAL
Qty: 0 | Refills: 0 | DISCHARGE
Start: 2023-03-22

## 2023-03-22 RX ORDER — IBUPROFEN 200 MG
1 TABLET ORAL
Qty: 0 | Refills: 0 | DISCHARGE
Start: 2023-03-22

## 2023-03-22 RX ORDER — LEVOTHYROXINE SODIUM 125 MCG
1 TABLET ORAL
Qty: 0 | Refills: 0 | DISCHARGE

## 2023-03-22 RX ADMIN — Medication 600 MILLIGRAM(S): at 00:46

## 2023-03-22 RX ADMIN — Medication 975 MILLIGRAM(S): at 15:56

## 2023-03-22 RX ADMIN — Medication 975 MILLIGRAM(S): at 09:02

## 2023-03-22 RX ADMIN — Medication 975 MILLIGRAM(S): at 02:42

## 2023-03-22 RX ADMIN — Medication 600 MILLIGRAM(S): at 12:40

## 2023-03-22 RX ADMIN — Medication 600 MILLIGRAM(S): at 18:04

## 2023-03-22 RX ADMIN — Medication 975 MILLIGRAM(S): at 21:00

## 2023-03-22 RX ADMIN — Medication 975 MILLIGRAM(S): at 20:28

## 2023-03-22 RX ADMIN — HEPARIN SODIUM 5000 UNIT(S): 5000 INJECTION INTRAVENOUS; SUBCUTANEOUS at 18:05

## 2023-03-22 RX ADMIN — Medication 975 MILLIGRAM(S): at 15:29

## 2023-03-22 RX ADMIN — Medication 600 MILLIGRAM(S): at 12:08

## 2023-03-22 RX ADMIN — Medication 975 MILLIGRAM(S): at 09:35

## 2023-03-22 RX ADMIN — Medication 975 MILLIGRAM(S): at 03:30

## 2023-03-22 RX ADMIN — SIMETHICONE 80 MILLIGRAM(S): 80 TABLET, CHEWABLE ORAL at 09:04

## 2023-03-22 RX ADMIN — Medication 175 MICROGRAM(S): at 06:12

## 2023-03-22 RX ADMIN — SIMETHICONE 80 MILLIGRAM(S): 80 TABLET, CHEWABLE ORAL at 20:29

## 2023-03-22 RX ADMIN — Medication 600 MILLIGRAM(S): at 18:35

## 2023-03-22 RX ADMIN — Medication 600 MILLIGRAM(S): at 06:49

## 2023-03-22 RX ADMIN — Medication 600 MILLIGRAM(S): at 06:12

## 2023-03-22 RX ADMIN — HEPARIN SODIUM 5000 UNIT(S): 5000 INJECTION INTRAVENOUS; SUBCUTANEOUS at 06:23

## 2023-03-22 NOTE — PROGRESS NOTE ADULT - SUBJECTIVE AND OBJECTIVE BOX
OB Progress Note: LTCS, POD#2    S: 38yo POD#2 s/p LTCS. Pain is well controlled. She is tolerating a regular diet and passing flatus. She is voiding spontaneously, and ambulating without difficulty. Denies CP/SOB. Denies lightheadedness/dizziness. Denies N/V.    O:  Vitals:  Vital Signs Last 24 Hrs  T(C): 36.8 (22 Mar 2023 06:13), Max: 36.8 (21 Mar 2023 09:16)  T(F): 98.3 (22 Mar 2023 06:13), Max: 98.3 (21 Mar 2023 09:16)  HR: 96 (22 Mar 2023 06:13) (83 - 98)  BP: 116/75 (22 Mar 2023 06:13) (116/75 - 133/85)  BP(mean): --  RR: 18 (22 Mar 2023 06:13) (18 - 18)  SpO2: 96% (22 Mar 2023 06:13) (96% - 98%)    Parameters below as of 22 Mar 2023 06:13  Patient On (Oxygen Delivery Method): room air        MEDICATIONS  (STANDING):  acetaminophen     Tablet .. 975 milliGRAM(s) Oral <User Schedule>  diphtheria/tetanus/pertussis (acellular) Vaccine (Adacel) 0.5 milliLiter(s) IntraMuscular once  heparin   Injectable 5000 Unit(s) SubCutaneous every 12 hours  ibuprofen  Tablet. 600 milliGRAM(s) Oral every 6 hours  lactated ringers. 1000 milliLiter(s) (125 mL/Hr) IV Continuous <Continuous>  levothyroxine 175 MICROGram(s) Oral daily  oxytocin Infusion 333.333 milliUNIT(s)/Min (1000 mL/Hr) IV Continuous <Continuous>  oxytocin Infusion 333.333 milliUNIT(s)/Min (1000 mL/Hr) IV Continuous <Continuous>      MEDICATIONS  (PRN):  diphenhydrAMINE 25 milliGRAM(s) Oral every 6 hours PRN Pruritus  lanolin Ointment 1 Application(s) Topical every 6 hours PRN Sore Nipples  magnesium hydroxide Suspension 30 milliLiter(s) Oral two times a day PRN Constipation  oxyCODONE    IR 5 milliGRAM(s) Oral every 3 hours PRN Moderate to Severe Pain (4-10)  simethicone 80 milliGRAM(s) Chew every 4 hours PRN Gas      Labs:  Blood type: A Positive  Rubella IgG: RPR: Negative                          10.6<L>   19.63<H> >-----------< 189    ( 03-21 @ 06:59 )             32.5<L>                  PE:  General: NAD  Abdomen: Soft, appropriately tender, incision c/d/i.  Extremities: No erythema, no pitting edema

## 2023-03-22 NOTE — PROVIDER CONTACT NOTE (OTHER) - ASSESSMENT
pt. denies headache, visual problems, palpitations, SOB, dizziness, statesthat from time to time she feels hot and then cold

## 2023-03-22 NOTE — DISCHARGE NOTE OB - MEDICATION SUMMARY - MEDICATIONS TO TAKE
I will START or STAY ON the medications listed below when I get home from the hospital:    acetaminophen 325 mg oral tablet  -- 3 tab(s) by mouth every 6 hours, As Needed  -- Indication: For Mild pain    ibuprofen 600 mg oral tablet  -- 1 tab(s) by mouth every 6 hours  -- Indication: For Moderate pain    oxyCODONE 5 mg oral tablet  -- 1 tab(s) by mouth every 3 hours, As needed, Moderate to Severe Pain (4-10) MDD:6  -- Indication: For severe pain    Prenatal 1 oral capsule  -- orally once a day  -- Indication: For postpartum

## 2023-03-22 NOTE — DISCHARGE NOTE OB - NS MD DC FALL RISK RISK
For information on Fall & Injury Prevention, visit: [email or web address] OR  [email or web address] OR  https://www.cdc.gov/steadi/patient.html

## 2023-03-22 NOTE — DISCHARGE NOTE OB - CARE PLAN
1 Principal Discharge DX:	Single delivery by   Assessment and plan of treatment:	followup 2 weeks for incision check

## 2023-03-22 NOTE — DISCHARGE NOTE OB - PATIENT PORTAL LINK FT
You can access the FollowMyHealth Patient Portal offered by [facility] by registering at the following website: [email or web address]. By joining [facility]’s FollowMyHealth portal, you will also be able to view your health information using other applications (apps) compatible with our system.

## 2023-03-22 NOTE — PROVIDER CONTACT NOTE (OTHER) - SITUATION
at 1634 pt /92, HR 98, temp. 36.3, RR 18, spO2 97, repeat  at 1837     /89, HR 98, temp. 36.5, RR 18, spO2 97

## 2023-03-22 NOTE — DISCHARGE NOTE OB - LAUNCH MEDICATION RECONCILIATION
Subjective    Juan Pablo Torres is a 9 year old male who presents to clinic today with mother and father because of:  Dehydration and Vomiting     HPI   Fever negative.  Throwing up started Tuesday 5PM  Threw up every 10-15 minutes till 5AM next morning.    Ok Wednesday.  No throwing up.  Once yesterday and twice today.  No diarrhea.  Tube fed.  Mom gave water Wednesday, switched to pedialyte yesterday.  Did continuous instead of bolus with that and seemed to go ok.  No giving as much volume as he normally takes as that seem to make him throw up.  I believe they were giving 20 ml per hour overnight, but forgot to verify that my understanding of that was correct.  Started doing more of bolus (60 ml) this AM, first tolerated ok, second through up couple times.  Normally on Nourish, 9 oz every 4 hours.  Has not had it last several days.  Seems kind of weak to mom today, not as interactive as usual.    One wet diaper last night, one couple hours ago.    Background sister had vomiting (freqeunt for 48 hours, then one next day, then done, no diarrhea or fever, fine now).  Her last emesis was 2 days prior to Juan Pablo starting to vomit.    Juan Pablo did have some issues with vomiting in past where there was concerns about possible increased intracranial pressure, but those felt different to parent.  Occasional emesis here and there, nothing concentrated.  This had resolved for several month and neurosurgery was not concerned few weeks ago.  Has had two craniotomies for craniosynostosis, no shunt.    Juan Pablo is non verbal, somewhat fretful this week, but mom unsure if something hurts or where.  If they do something painful to him, will let them know (screams/shows pain), but nothing along those lines this week.    He is on neurontin.  Also allergy med, albuterol.  Vitamins.        Review of Systems  ROS negative with mom for heart, kidney, liver, endocrine, immune system problems.    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    Problem List  Patient Active Problem List    Diagnosis Date Noted     Hypoxia, sleep related 01/28/2012     Priority: High     GERD with h/o silent Aspiration. JG tube in place 2011     Priority: High     Congenital malformation 2011     Priority: High     (Problem list name updated by automated process. Provider to review and confirm.)       Asthma, well controlled, mild intermittent 07/01/2019     Priority: Medium     Neuromuscular scoliosis of thoracolumbar region 07/10/2016     Priority: Medium     MRSA infection 04/05/2015     Priority: Medium     Erythema migrans (Lyme disease) 07/09/2014     Priority: Medium     Failure to thrive in child 05/10/2013     Priority: Medium     Profound developmental delay 01/28/2012     Priority: Medium     Mild PPS (peripheral pulmonic stenosis) 2011     Priority: Medium     No SBE prophylaxis       SIRS (systemic inflammatory response syndrome) (H) 2011     Priority: Medium     Acute renal failure with other specified pathological lesion in kidney (H) 2011     Priority: Medium     Problem list name updated by automated process. Provider to review       Gastrostomy in place, 5/12 2011     Priority: Medium     Hx of UTI Grade I VUR 2011     Priority: Medium     5/7 Klebsiella       HTN (hypertension) 2011     Priority: Medium     Cerebellar hypoplasia (H) 2011     Priority: Medium     Micropenis 2011     Priority: Medium     Cataract, congenital 2011     Priority: Medium     (Problem list name updated by automated process. Provider to review and confirm.)       Term Infant IUGR (intrauterine growth restriction) 2011     Priority: Medium     Arthrogryposis with Bilateral Hip Dislocation 2011     Priority: Medium     Possible Cutis laxa 2011     Priority: Medium     Corpus callosum, agenesis (H) 2011     Priority: Low     "  Medications  albuterol (PROVENTIL) (2.5 MG/3ML) 0.083% neb solution, Take 1 vial (2.5 mg) by nebulization every 4 hours as needed for shortness of breath / dyspnea or wheezing  cetirizine (ZYRTEC CHILDRENS HIVES RELIEF) 5 MG/5ML solution, 5-7.5 ml once a day for allergies  Coloplast barrier cream CREA, Apply liberally to open wounds in the diaper area.  Ferrous Sulfate (IRON SUPPLEMENT PO), Take 1 mL by mouth daily (with breakfast) Reported on 3/3/2017  ibuprofen (ADVIL,MOTRIN) 100 MG/5ML suspension, Take 10 mg/kg by mouth every 4 hours as needed Reported on 3/3/2017  polyethylene glycol (MIRALAX/GLYCOLAX) powder, Take 17 g (1 capful) by mouth 2 times daily  ranitidine (ZANTAC) 75 MG/5ML syrup, 1.6 ml TID  gabapentin (NEURONTIN) 250 MG/5ML solution, Take 2.5 mLs (125 mg) by mouth At Bedtime  POOP GOOP, METRO MIXED,, 8% Stoma adhesive, 8% Talc, 4% Miconazole, 6 % Mineral Oil, 74% Eucerine Cream (Patient not taking: Reported on 2/21/2020)    No current facility-administered medications on file prior to visit.     Allergies  Allergies   Allergen Reactions     Adhesive Tape      Seasonal Allergies      Fentanyl Rash     Morphine Rash     Reviewed and updated as needed this visit by Provider           Objective    Pulse 92   Temp 97.9  F (36.6  C) (Axillary)   Resp 20   Ht 3' 9\" (1.143 m)   Wt 34 lb (15.4 kg)   SpO2 97%   BMI 11.80 kg/m    <1 %ile based on CDC (Boys, 2-20 Years) weight-for-age data based on Weight recorded on 2/21/2020.  No blood pressure reading on file for this encounter.    Physical Exam  GENERAL: Active, alert, in no acute distress.  SKIN: Clear. No significant rash, abnormal pigmentation or lesions  HEAD: Normocephalic.  EYES:  No discharge or erythema. Normal pupils and EOM.  EARS: Normal canals. Tympanic membranes are normal; gray and translucent.  NOSE: Normal without discharge.  MOUTH/THROAT: lips dry, mucous membranes with saliva but not as much as would expect.    NECK: Supple, no " masses.  LYMPH NODES: No adenopathy  LUNGS: Clear. No rales, rhonchi, wheezing or retractions  HEART: Regular rhythm. Normal S1/S2. No murmurs.  ABDOMEN: Soft, non-tender, not distended, no masses or hepatosplenomegaly. Bowel sounds normal.     Diagnostics: As ordered.       Assessment & Plan    Vomiting/Dehydration.  Would lean heavily towards the vomiting being viral/infectious based on sibling having similar symptoms few days previously and abrupt onset.  He is not having diarrhea but has only had water or pedialyte last three days.  His exam is consistent with mild to mod dehydration and history of decreased Uo and energy concerning.  Decided to do labs and then decide if hospitalization/ER necessary of if can work with the fluids being given to him through the g-tube.  Labs came back somewhat confusing.  Has normal CRP but elevated WBC, but no fevers.  Would still lean towards this being a viral gastroenteritis with dehydration.  CO2 normal, but elevated significantly on BUN and Creatinine, suspicious for more severe dehydration vs significant kidney issue.  With absence of anemia and diarrhea would not be thinking HUS.  Will have him go to [facility] ER for further evaluation and treatment..  Parent in agreement with going.      Follow Up  ER.      Keny Maria MD           <<-----Click here for Discharge Medication Review

## 2023-03-23 ENCOUNTER — APPOINTMENT (OUTPATIENT)
Dept: OBGYN | Facility: CLINIC | Age: 38
End: 2023-03-23

## 2023-03-23 VITALS
HEART RATE: 89 BPM | DIASTOLIC BLOOD PRESSURE: 88 MMHG | TEMPERATURE: 98 F | SYSTOLIC BLOOD PRESSURE: 134 MMHG | OXYGEN SATURATION: 99 % | RESPIRATION RATE: 18 BRPM

## 2023-03-23 PROCEDURE — 59050 FETAL MONITOR W/REPORT: CPT

## 2023-03-23 PROCEDURE — 86780 TREPONEMA PALLIDUM: CPT

## 2023-03-23 PROCEDURE — 36415 COLL VENOUS BLD VENIPUNCTURE: CPT

## 2023-03-23 PROCEDURE — 86900 BLOOD TYPING SEROLOGIC ABO: CPT

## 2023-03-23 PROCEDURE — 86901 BLOOD TYPING SEROLOGIC RH(D): CPT

## 2023-03-23 PROCEDURE — 59025 FETAL NON-STRESS TEST: CPT

## 2023-03-23 PROCEDURE — 86769 SARS-COV-2 COVID-19 ANTIBODY: CPT

## 2023-03-23 PROCEDURE — 85025 COMPLETE CBC W/AUTO DIFF WBC: CPT

## 2023-03-23 PROCEDURE — 86850 RBC ANTIBODY SCREEN: CPT

## 2023-03-23 RX ADMIN — Medication 975 MILLIGRAM(S): at 08:25

## 2023-03-23 RX ADMIN — Medication 975 MILLIGRAM(S): at 14:11

## 2023-03-23 RX ADMIN — Medication 975 MILLIGRAM(S): at 14:41

## 2023-03-23 RX ADMIN — Medication 975 MILLIGRAM(S): at 02:57

## 2023-03-23 RX ADMIN — Medication 175 MICROGRAM(S): at 06:06

## 2023-03-23 RX ADMIN — HEPARIN SODIUM 5000 UNIT(S): 5000 INJECTION INTRAVENOUS; SUBCUTANEOUS at 06:06

## 2023-03-23 RX ADMIN — Medication 600 MILLIGRAM(S): at 00:30

## 2023-03-23 RX ADMIN — Medication 600 MILLIGRAM(S): at 06:06

## 2023-03-23 RX ADMIN — Medication 975 MILLIGRAM(S): at 03:30

## 2023-03-23 RX ADMIN — Medication 975 MILLIGRAM(S): at 08:55

## 2023-03-23 RX ADMIN — Medication 600 MILLIGRAM(S): at 00:02

## 2023-03-23 NOTE — PROGRESS NOTE ADULT - SUBJECTIVE AND OBJECTIVE BOX
OB Postpartum Note:  Delivery    S: 38yo now POD #3 s/p pLTCS. Her pain is well controlled. She is tolerating a regular diet and passing flatus. Voiding spontaneously and ambulating without difficulty. Denies Nausea/vomiting/CP/SOB/lightheadedness/dizziness/headache/epigastric pain/changes in vision.    O:   Vitals:  Vital Signs Last 24 Hrs  T(C): 36.8 (23 Mar 2023 01:00), Max: 36.8 (22 Mar 2023 06:13)  T(F): 98.3 (23 Mar 2023 01:00), Max: 98.3 (22 Mar 2023 06:13)  HR: 83 (23 Mar 2023 01:00) (83 - 98)  BP: 128/78 (23 Mar 2023 01:00) (116/75 - 148/89)  BP(mean): --  RR: 18 (23 Mar 2023 01:00) (18 - 18)  SpO2: 96% (23 Mar 2023 01:00) (96% - 97%)    Parameters below as of 23 Mar 2023 01:00  Patient On (Oxygen Delivery Method): room air        MEDICATIONS  (STANDING):  acetaminophen     Tablet .. 975 milliGRAM(s) Oral <User Schedule>  diphtheria/tetanus/pertussis (acellular) Vaccine (Adacel) 0.5 milliLiter(s) IntraMuscular once  heparin   Injectable 5000 Unit(s) SubCutaneous every 12 hours  ibuprofen  Tablet. 600 milliGRAM(s) Oral every 6 hours  lactated ringers. 1000 milliLiter(s) (125 mL/Hr) IV Continuous <Continuous>  levothyroxine 175 MICROGram(s) Oral daily  oxytocin Infusion 333.333 milliUNIT(s)/Min (1000 mL/Hr) IV Continuous <Continuous>  oxytocin Infusion 333.333 milliUNIT(s)/Min (1000 mL/Hr) IV Continuous <Continuous>    MEDICATIONS  (PRN):  diphenhydrAMINE 25 milliGRAM(s) Oral every 6 hours PRN Pruritus  lanolin Ointment 1 Application(s) Topical every 6 hours PRN Sore Nipples  magnesium hydroxide Suspension 30 milliLiter(s) Oral two times a day PRN Constipation  oxyCODONE    IR 5 milliGRAM(s) Oral every 3 hours PRN Moderate to Severe Pain (4-10)  simethicone 80 milliGRAM(s) Chew every 4 hours PRN Gas      Labs:  Blood type: A Positive  Rubella IgG: RPR: Negative                          10.6<L>   19.63<H> >-----------< 189    (  @ 06:59 )             32.5<L>                  PE:  General: NAD, patient resting comfortably in bed  Abdomen: Mildly distended, appropriately tender. Fundus firm.  Incision: Clean, dry, intact.  : appropriate amount of lochia on pad  Extremities: SCDs in place, no erythema

## 2023-03-23 NOTE — PROGRESS NOTE ADULT - ASSESSMENT
A/P: 38yo POD #2 s/p elective pLTCS.  Patient is stable and doing well post-operatively.      #Hypothyroidism  - continue Synthroid 175 mcg daily    #Postpartum recovery from  section,   - Continue regular diet.  - Increase ambulation.  - PO pain medication with Tylenol, Motrin and Oxycodone PRN for pain control.    - Hct: 37.9-> 32.5  - DVT prophylaxis with Heparin 5000u BID
A/P: 38yo POD #1 s/p elective pLTCS.  Patient is stable and doing well post-operatively.      #Hypothyroidism  - continue Synthroid 175 mcg daily    #Postpartum recovery from  section,   - Continue regular diet.  - Increase ambulation.  - PO pain medication with Tylenol, Motrin and Oxycodone PRN for pain control.    - POD #1 CBC this morning.   - DVT prophylaxis with Heparin 5000u BID    Trixie Mitchell PGY1    
A/P: 38yo POD #3 s/p elective pLTCS.  Patient is stable and doing well post-operatively.      #Hypothyroidism  - continue Synthroid 175 mcg daily    #Postpartum recovery from  section,   - Continue regular diet.  - Increase ambulation.  - PO pain medication with Tylenol, Motrin and Oxycodone PRN for pain control.    - Hct: 37.9-> 32.5  - DVT prophylaxis with Heparin 5000u BID    Trixie Mitchell PGY1

## 2023-03-27 ENCOUNTER — EMERGENCY (EMERGENCY)
Facility: HOSPITAL | Age: 38
LOS: 1 days | Discharge: HOME | End: 2023-03-27
Attending: EMERGENCY MEDICINE
Payer: COMMERCIAL

## 2023-03-27 VITALS
SYSTOLIC BLOOD PRESSURE: 135 MMHG | DIASTOLIC BLOOD PRESSURE: 83 MMHG | TEMPERATURE: 98 F | HEART RATE: 83 BPM | RESPIRATION RATE: 18 BRPM | OXYGEN SATURATION: 98 %

## 2023-03-27 VITALS
WEIGHT: 240.08 LBS | HEIGHT: 67 IN | TEMPERATURE: 98 F | RESPIRATION RATE: 16 BRPM | HEART RATE: 78 BPM | OXYGEN SATURATION: 99 % | SYSTOLIC BLOOD PRESSURE: 150 MMHG | DIASTOLIC BLOOD PRESSURE: 96 MMHG

## 2023-03-27 DIAGNOSIS — Z98.890 OTHER SPECIFIED POSTPROCEDURAL STATES: Chronic | ICD-10-CM

## 2023-03-27 LAB
ALBUMIN SERPL ELPH-MCNC: 3.7 G/DL — SIGNIFICANT CHANGE UP (ref 3.3–5)
ALP SERPL-CCNC: 83 U/L — SIGNIFICANT CHANGE UP (ref 40–120)
ALT FLD-CCNC: 36 U/L — SIGNIFICANT CHANGE UP (ref 10–45)
ANION GAP SERPL CALC-SCNC: 12 MMOL/L — SIGNIFICANT CHANGE UP (ref 5–17)
APTT BLD: 27.4 SEC — LOW (ref 27.5–35.5)
AST SERPL-CCNC: 26 U/L — SIGNIFICANT CHANGE UP (ref 10–40)
BASE EXCESS BLDV CALC-SCNC: -1 MMOL/L — SIGNIFICANT CHANGE UP (ref -2–3)
BASOPHILS # BLD AUTO: 0 K/UL — SIGNIFICANT CHANGE UP (ref 0–0.2)
BASOPHILS NFR BLD AUTO: 0 % — SIGNIFICANT CHANGE UP (ref 0–2)
BILIRUB SERPL-MCNC: 0.1 MG/DL — LOW (ref 0.2–1.2)
BUN SERPL-MCNC: 13 MG/DL — SIGNIFICANT CHANGE UP (ref 7–23)
CA-I SERPL-SCNC: 1.24 MMOL/L — SIGNIFICANT CHANGE UP (ref 1.15–1.33)
CALCIUM SERPL-MCNC: 9.1 MG/DL — SIGNIFICANT CHANGE UP (ref 8.4–10.5)
CHLORIDE BLDV-SCNC: 106 MMOL/L — SIGNIFICANT CHANGE UP (ref 96–108)
CHLORIDE SERPL-SCNC: 105 MMOL/L — SIGNIFICANT CHANGE UP (ref 96–108)
CO2 BLDV-SCNC: 26 MMOL/L — SIGNIFICANT CHANGE UP (ref 22–26)
CO2 SERPL-SCNC: 24 MMOL/L — SIGNIFICANT CHANGE UP (ref 22–31)
CREAT ?TM UR-MCNC: 18 MG/DL — SIGNIFICANT CHANGE UP
CREAT SERPL-MCNC: 0.69 MG/DL — SIGNIFICANT CHANGE UP (ref 0.5–1.3)
EGFR: 115 ML/MIN/1.73M2 — SIGNIFICANT CHANGE UP
EOSINOPHIL # BLD AUTO: 0.84 K/UL — HIGH (ref 0–0.5)
EOSINOPHIL NFR BLD AUTO: 7.8 % — HIGH (ref 0–6)
GAS PNL BLDV: 137 MMOL/L — SIGNIFICANT CHANGE UP (ref 136–145)
GAS PNL BLDV: SIGNIFICANT CHANGE UP
GLUCOSE BLDV-MCNC: 91 MG/DL — SIGNIFICANT CHANGE UP (ref 70–99)
GLUCOSE SERPL-MCNC: 98 MG/DL — SIGNIFICANT CHANGE UP (ref 70–99)
HCG SERPL-ACNC: 33.5 MIU/ML — HIGH
HCO3 BLDV-SCNC: 25 MMOL/L — SIGNIFICANT CHANGE UP (ref 22–29)
HCT VFR BLD CALC: 33.6 % — LOW (ref 34.5–45)
HCT VFR BLDA CALC: 36 % — SIGNIFICANT CHANGE UP (ref 34.5–46.5)
HGB BLD CALC-MCNC: 12 G/DL — SIGNIFICANT CHANGE UP (ref 11.7–16.1)
HGB BLD-MCNC: 11.3 G/DL — LOW (ref 11.5–15.5)
INR BLD: 0.97 RATIO — SIGNIFICANT CHANGE UP (ref 0.88–1.16)
LACTATE BLDV-MCNC: 1.3 MMOL/L — SIGNIFICANT CHANGE UP (ref 0.5–2)
LDH SERPL L TO P-CCNC: 242 U/L — SIGNIFICANT CHANGE UP (ref 50–242)
LYMPHOCYTES # BLD AUTO: 2.14 K/UL — SIGNIFICANT CHANGE UP (ref 1–3.3)
LYMPHOCYTES # BLD AUTO: 20 % — SIGNIFICANT CHANGE UP (ref 13–44)
MAGNESIUM SERPL-MCNC: 2.1 MG/DL — SIGNIFICANT CHANGE UP (ref 1.6–2.6)
MANUAL SMEAR VERIFICATION: SIGNIFICANT CHANGE UP
MCHC RBC-ENTMCNC: 32.7 PG — SIGNIFICANT CHANGE UP (ref 27–34)
MCHC RBC-ENTMCNC: 33.6 GM/DL — SIGNIFICANT CHANGE UP (ref 32–36)
MCV RBC AUTO: 97.1 FL — SIGNIFICANT CHANGE UP (ref 80–100)
MONOCYTES # BLD AUTO: 0.56 K/UL — SIGNIFICANT CHANGE UP (ref 0–0.9)
MONOCYTES NFR BLD AUTO: 5.2 % — SIGNIFICANT CHANGE UP (ref 2–14)
NEUTROPHILS # BLD AUTO: 7.08 K/UL — SIGNIFICANT CHANGE UP (ref 1.8–7.4)
NEUTROPHILS NFR BLD AUTO: 66.1 % — SIGNIFICANT CHANGE UP (ref 43–77)
NT-PROBNP SERPL-SCNC: 136 PG/ML — SIGNIFICANT CHANGE UP (ref 0–300)
PCO2 BLDV: 45 MMHG — HIGH (ref 39–42)
PH BLDV: 7.35 — SIGNIFICANT CHANGE UP (ref 7.32–7.43)
PLAT MORPH BLD: NORMAL — SIGNIFICANT CHANGE UP
PLATELET # BLD AUTO: 237 K/UL — SIGNIFICANT CHANGE UP (ref 150–400)
PO2 BLDV: 44 MMHG — SIGNIFICANT CHANGE UP (ref 25–45)
POTASSIUM BLDV-SCNC: 4.4 MMOL/L — SIGNIFICANT CHANGE UP (ref 3.5–5.1)
POTASSIUM SERPL-MCNC: 3.8 MMOL/L — SIGNIFICANT CHANGE UP (ref 3.5–5.3)
POTASSIUM SERPL-SCNC: 3.8 MMOL/L — SIGNIFICANT CHANGE UP (ref 3.5–5.3)
PROT ?TM UR-MCNC: <7 MG/DL — SIGNIFICANT CHANGE UP (ref 0–12)
PROT SERPL-MCNC: 6.6 G/DL — SIGNIFICANT CHANGE UP (ref 6–8.3)
PROT/CREAT UR-RTO: <0.4 RATIO — HIGH (ref 0–0.2)
PROTHROM AB SERPL-ACNC: 11.1 SEC — SIGNIFICANT CHANGE UP (ref 10.5–13.4)
RBC # BLD: 3.46 M/UL — LOW (ref 3.8–5.2)
RBC # FLD: 13.8 % — SIGNIFICANT CHANGE UP (ref 10.3–14.5)
RBC BLD AUTO: SIGNIFICANT CHANGE UP
SAO2 % BLDV: 69.5 % — SIGNIFICANT CHANGE UP (ref 67–88)
SODIUM SERPL-SCNC: 141 MMOL/L — SIGNIFICANT CHANGE UP (ref 135–145)
TROPONIN T, HIGH SENSITIVITY RESULT: <6 NG/L — SIGNIFICANT CHANGE UP (ref 0–51)
URATE SERPL-MCNC: 7.4 MG/DL — HIGH (ref 2.5–7)
VARIANT LYMPHS # BLD: 0.9 % — SIGNIFICANT CHANGE UP (ref 0–6)
WBC # BLD: 10.71 K/UL — HIGH (ref 3.8–10.5)
WBC # FLD AUTO: 10.71 K/UL — HIGH (ref 3.8–10.5)

## 2023-03-27 PROCEDURE — 85018 HEMOGLOBIN: CPT

## 2023-03-27 PROCEDURE — 83615 LACTATE (LD) (LDH) ENZYME: CPT

## 2023-03-27 PROCEDURE — 85610 PROTHROMBIN TIME: CPT

## 2023-03-27 PROCEDURE — 83880 ASSAY OF NATRIURETIC PEPTIDE: CPT

## 2023-03-27 PROCEDURE — 84132 ASSAY OF SERUM POTASSIUM: CPT

## 2023-03-27 PROCEDURE — 84484 ASSAY OF TROPONIN QUANT: CPT

## 2023-03-27 PROCEDURE — 93005 ELECTROCARDIOGRAM TRACING: CPT

## 2023-03-27 PROCEDURE — 82435 ASSAY OF BLOOD CHLORIDE: CPT

## 2023-03-27 PROCEDURE — 85025 COMPLETE CBC W/AUTO DIFF WBC: CPT

## 2023-03-27 PROCEDURE — 99285 EMERGENCY DEPT VISIT HI MDM: CPT | Mod: 25

## 2023-03-27 PROCEDURE — 82570 ASSAY OF URINE CREATININE: CPT

## 2023-03-27 PROCEDURE — 84550 ASSAY OF BLOOD/URIC ACID: CPT

## 2023-03-27 PROCEDURE — 70450 CT HEAD/BRAIN W/O DYE: CPT | Mod: 26,MB

## 2023-03-27 PROCEDURE — 85730 THROMBOPLASTIN TIME PARTIAL: CPT

## 2023-03-27 PROCEDURE — 82803 BLOOD GASES ANY COMBINATION: CPT

## 2023-03-27 PROCEDURE — 71045 X-RAY EXAM CHEST 1 VIEW: CPT | Mod: 26

## 2023-03-27 PROCEDURE — 99285 EMERGENCY DEPT VISIT HI MDM: CPT

## 2023-03-27 PROCEDURE — 83735 ASSAY OF MAGNESIUM: CPT

## 2023-03-27 PROCEDURE — 83605 ASSAY OF LACTIC ACID: CPT

## 2023-03-27 PROCEDURE — 71045 X-RAY EXAM CHEST 1 VIEW: CPT

## 2023-03-27 PROCEDURE — 82330 ASSAY OF CALCIUM: CPT

## 2023-03-27 PROCEDURE — 80053 COMPREHEN METABOLIC PANEL: CPT

## 2023-03-27 PROCEDURE — 82947 ASSAY GLUCOSE BLOOD QUANT: CPT

## 2023-03-27 PROCEDURE — 84156 ASSAY OF PROTEIN URINE: CPT

## 2023-03-27 PROCEDURE — 84295 ASSAY OF SERUM SODIUM: CPT

## 2023-03-27 PROCEDURE — 84702 CHORIONIC GONADOTROPIN TEST: CPT

## 2023-03-27 PROCEDURE — 70450 CT HEAD/BRAIN W/O DYE: CPT | Mod: MB

## 2023-03-27 PROCEDURE — 85014 HEMATOCRIT: CPT

## 2023-03-27 NOTE — ED PROVIDER NOTE - NSFOLLOWUPINSTRUCTIONS_ED_ALL_ED_FT
Please follow up with your OB/GYN within 1 day.     Call 911 anytime you think you may need emergency care. For example, call if:    You passed out (lost consciousness).  You have a seizure.  Seek urgent, immediate medical care at the hospital if:    Your blood pressure is very high, such as 160/110 or higher.  You have symptoms of pre-eclampsia, such as:  Sudden swelling of your face, hands, or feet.  New vision problems (such as light sensitivity, blurring, or seeing spots).  A severe headache.  New right upper belly pain.  New severe nausea and vomiting.

## 2023-03-27 NOTE — CONSULT NOTE ADULT - ASSESSMENT
36 y/o  post-op day #7 from primary elective  section presenting to ED with intermittent headache and blurry vision changes, and increased lower extremity swelling presenting for r/o preeclampsia.    #Elevated BP's  - Patient w/ severe of 163/90 at 0045 with repeat of 153/90 at 1000  - f/u HELLP labs, CXR, CT Head per ED  - Agree with workup  - If patient has repeat severe (>160/110) within 1 hr of initial severe will require fast-acting BP and Mg  - Continue to monitor BPs  - Will f/u results  - Final plan pending results    d/w attending Dr Nikolas Cyr  PGY-2     38 y/o  post-op day #7 from primary elective  section presenting to ED with intermittent headache and blurry vision changes, and increased lower extremity swelling presenting for r/o preeclampsia.    #Elevated BP's  - Patient w/ severe of 163/90 at 0045 with repeat of 153/90 at 1000  - f/u HELLP labs, CXR, CT Head per ED  - Agree with workup  - If patient has repeat severe (>160/110) within 1 hr of initial severe will require fast-acting BP and Mg  - Continue to monitor BPs  - Will f/u results  - Final plan pending results    d/w attending Dr Nikolas Cyr  PGY-2    ADDENDUM 3/27/23@314a  - Patient reassessed at bedside, no longer symptomatic but states she feels tired  - BPs following initial severe 120s-130s/80s-90s  - HELLP labs wnl, CT Head negative, CXR negative  - Call office today (Monday) morning to make appt for Tuesday in office to f/u  - No acute GYN intervention  - Stable from GYN perspective for discharge    d/w attending Dr Nikolas Cyr  PGY-2

## 2023-03-27 NOTE — ED PROVIDER NOTE - ATTENDING CONTRIBUTION TO CARE
MD Monzon:  patient seen and evaluated personally.   I agree with the History & Physical,  Impression & Plan other than what was detailed in my note.  MD Monzon  38 y/o f hx of hypothyroidism, s/p delivery via c section 3/20, presenting w/ elevated bp. Pt states she has had leg swelling since two days after pregnancy, getting worse, her sister in law noticed this and told her to check bp and it was 160's, pt developed central chest pressure, non radiating, no associated nv, felt conssietnt w/ prior anxiety ,also had bv, and ha, came into ed, ha, cp, sob gone, no hx of dvt/pe, no hx pre eclampsia, afebrile bp 150's/90's repeat 140's/80's,  non toxic well appearing, NC/AT,  conjunctiva non conjected, sclera anicteric, moist mucous membranes, neck supple, heart sounds, normal, no mrg, lungs cta b/l no wrr, abd soft non distended w/ no tenderness, no visual deformities of extrem, pos swelling of lower extrem, axox3, , normal mood and affect, bp improved, leg swelling concerning, ob paged, tb evaluated for pre eclampsia, will hold on meds for now, help labs, ct head, re evaluate. not consistent w/ dvt given b/l swelling, not consitent w/ pe, cp resolved as well as sob.

## 2023-03-27 NOTE — ED ADULT NURSE NOTE - OBJECTIVE STATEMENT
Patient is 37 year old female complaining of elevated blood pressure.  3/20/23. A&Ox4. Ambulates steady. Patient reports headache endorsing blurring vison for 2-3 days, worsening bilateral lower swelling. On assessment able to move all extremities well, bilateral equal hand grasp, clear speech, breathing comfortably, no accessory muscle use, no cough, chest rise and fall equal, on room air, diminished breath sounds bilaterally, bilateral lower leg edema noted. Patient denies dizziness, chest pain, palpitations, cough, SOB, abdominal pain, n/v/d, urinary symptoms, fevers, chills, weakness at this time.

## 2023-03-27 NOTE — ED PROVIDER NOTE - OBJECTIVE STATEMENT
37-year-old female with past medical history of hypothyroidism, recently had  5 days ago, presenting for elevated blood pressure. Patient reports for past 2 to 3 days she has had headache with associated blurred vision intermittently with no associated LOC, focal weakness, numbness/tingling. Not on anticoagulation.  Also reports intermittent midsternal chest pain starting today with no associated shortness of breath, diaphoresis, palpitations.  Progressive worsening of lower extremity swelling for past couple days and believes swelling has spread diffusely throughout her body.  Denies any fever/chills, nausea/vomiting/diarrhea, cough, rashes, abdominal pain, or changes in urination.

## 2023-03-27 NOTE — ED PROVIDER NOTE - PROGRESS NOTE DETAILS
Attending Na:  pt brought to tricia damon, currently in bathroom Attending Masom:  ob at bs Fredis, PGY-3  Repeat /92. OB/GYN reports no Labetalol or Mg at this time Fredis, PGY-3  SBPs consistently in 120s for past couple of hours with DBPs <90s. CT head shows no ICH. Labs non-actionable. OB/GYN reports no acute intervention at this time and report okay for outpatient follow up tomorrow. Patient asymptomatic at this time. Will d/c with return precautions

## 2023-03-27 NOTE — CONSULT NOTE ADULT - SUBJECTIVE AND OBJECTIVE BOX
APURVA MORALES  37y  Female [id number]    HPI:  36 y/o  post-op day #7 from primary elective  section presenting to ED with intermittent headache and blurry vision changes, and increased lower extremity swelling. GYN consulted for concern of postpartum preeclampsia with elevated BPs.    Patient states that she has been having "migraine-like" headaches that come and spontaneously resolve. At time of evaluation denied headache. She also has been having episodes of blurry vision or little spots in her vision, which were also not present at time of evaluation. Also endorses increased lower extremity swelling that initially started to go away but once again increased. Denies leg cramping or warmth, notes some feeling of numbness in her feet. Otherwise ambulating, tolerating a regular diet, having regular bowel movements. Denies fevers, chills, current headache, RUQ pain, epigastric pain.     Name of GYN Physician: Fabricio    ObHx:  elective pLTCS, MA Bs/p D&Cx1  GynHx: Denies fibroids, cysts, endometriosis, STI's, Abnormal pap smears   PMHx: hypothyroidism  SurgHx: LSC L ovarian cystectomy  Meds: Synthroid  Allergies: PCN (hives), Azithromycin (swelling), Cefzil (swelling)  Social History:  Denies smoking use, drug use, alcohol use.    Vital Signs Last 24 Hrs  T(C): 36.7 (27 Mar 2023 00:30), Max: 36.7 (27 Mar 2023 00:30)  T(F): 98 (27 Mar 2023 00:30), Max: 98 (27 Mar 2023 00:30)  HR: 78 (27 Mar 2023 00:30) (78 - 78)  BP: 150/96 (27 Mar 2023 00:30) (150/96 - 150/96)  BP(mean): --  RR: 16 (27 Mar 2023 00:30) (16 - 16)  SpO2: 99% (27 Mar 2023 00:30) (99% - 99%)    Parameters below as of 27 Mar 2023 00:30  Patient On (Oxygen Delivery Method): room air    Physical Exam:   General: sitting comfortably in bed, NAD   HEENT: neck supple, full ROM  CV: RR S1S2 no m/r/g  Lungs: CTA b/l, good air flow b/l   Back: No CVA tenderness  Abd: Soft, non-tender, non-distended. Pfannensteil incision w/ overlaying dermabond C/D/I.  Bowel sounds present.    : No bleeding on pad. APURVA MORALES  37y  Female [id number]    HPI:  36 y/o  post-op day #7 from primary elective  section presenting to ED with intermittent headache and blurry vision changes, and increased lower extremity swelling. GYN consulted for concern of postpartum preeclampsia with elevated BPs.    Patient states that she has been having "migraine-like" headaches that come and spontaneously resolve. At time of evaluation denied headache. She also has been having episodes of blurry vision or little spots in her vision, which were also not present at time of evaluation. Also endorses increased lower extremity swelling that initially started to go away but once again increased. Denies leg cramping or warmth, notes some feeling of numbness in her feet. Otherwise ambulating, tolerating a regular diet, having regular bowel movements. Denies fevers, chills, current headache, RUQ pain, epigastric pain.     Name of GYN Physician: Fabricio    ObHx:  elective pLTCS, MA Bs/p D&Cx1  GynHx: Denies fibroids, cysts, endometriosis, STI's, Abnormal pap smears   PMHx: hypothyroidism  SurgHx: LSC L ovarian cystectomy  Meds: Synthroid  Allergies: PCN (hives), Azithromycin (swelling), Cefzil (swelling)  Social History:  Denies smoking use, drug use, alcohol use.    Vital Signs Last 24 Hrs  T(C): 36.7 (27 Mar 2023 00:30), Max: 36.7 (27 Mar 2023 00:30)  T(F): 98 (27 Mar 2023 00:30), Max: 98 (27 Mar 2023 00:30)  HR: 78 (27 Mar 2023 00:30) (78 - 78)  BP: 150/96 (27 Mar 2023 00:30) (150/96 - 150/96)  BP(mean): --  RR: 16 (27 Mar 2023 00:30) (16 - 16)  SpO2: 99% (27 Mar 2023 00:30) (99% - 99%)    Parameters below as of 27 Mar 2023 00:30  Patient On (Oxygen Delivery Method): room air    Physical Exam:   General: sitting comfortably in bed, NAD   HEENT: neck supple, full ROM  CV: RR S1S2 no m/r/g  Lungs: CTA b/l, good air flow b/l   Back: No CVA tenderness  Abd: Soft, non-tender, non-distended. Pfannensteil incision w/ overlaying dermabond C/D/I.  Bowel sounds present.    : No bleeding on pad.  Ext: 1+ pitting edema bilateral lower extremities

## 2023-03-27 NOTE — ED PROVIDER NOTE - PATIENT PORTAL LINK FT
You can access the FollowMyHealth Patient Portal offered by [facility] by registering at the following website: [email or web address]. By joining [facility]’s FollowMyHealth portal, you will also be able to view your health information using other applications (apps) compatible with our system.

## 2023-03-27 NOTE — ED PROVIDER NOTE - PHYSICAL EXAMINATION
Gen: WDWN, NAD, comfortable appearing, Recent /91   HEENT: PERRLA, EOMI, no nasal discharge, mucous membranes moist, no oropharyngeal edema/erythema/exudates   CV: RRR, +S1/S2, no M/R/G, equal b/l radial pulses 2+  Resp: Decreased BS throughout b/l lung fields, no W/R/R, SPO2 >95% on RA, no increased WOB   GI: Abdomen soft non-distended, NTTP, no masses/organomegaly   MSK/Skin: No CVA tenderness, no open wounds, no bruising, diffuse LE pitting edema with mild UE edema   Neuro: CN2-12 grossly intact, A&Ox4, MS +5/5 in UE and LE BL, finger to nose smooth and rapid, gross sensation intact in UE and LE BL, negative pronator drift   Psych: appropriate mood

## 2023-03-27 NOTE — ED PROVIDER NOTE - CLINICAL SUMMARY MEDICAL DECISION MAKING FREE TEXT BOX
37-year-old female with past medical history of hypothyroidism, recently had  5 days ago, presenting for elevated blood pressure, headache with blurry vision, chest pain, and increased LE swelling, /91 in room, no FND, otherwise well appearing, OB/GYN consulted due to c/f preeclampsia/HELLP vs. postpartum cardiomyopathy vs. low suspicion for ICH given headache in setting of high BP; CT head, labs/liver enzymes, LDH, uric acid, troponin/pro-BNP and likely will require Mg vs. labetalol; pending OB/GYN recs

## 2023-03-27 NOTE — ED ADULT NURSE NOTE - CHIEF COMPLAINT QUOTE
post partem hypertension s/p c section 3/21 also complaints of migraines and blurry vision and increased swelling

## 2023-03-27 NOTE — ED PROVIDER NOTE - CARE PLAN
1 Principal Discharge DX:	Preeclampsia in postpartum period   Principal Discharge DX:	Elevated blood pressure reading  Secondary Diagnosis:	Leg swelling

## 2023-03-28 ENCOUNTER — APPOINTMENT (OUTPATIENT)
Dept: OBGYN | Facility: CLINIC | Age: 38
End: 2023-03-28
Payer: COMMERCIAL

## 2023-03-28 VITALS — SYSTOLIC BLOOD PRESSURE: 151 MMHG | DIASTOLIC BLOOD PRESSURE: 92 MMHG

## 2023-03-28 VITALS — DIASTOLIC BLOOD PRESSURE: 86 MMHG | SYSTOLIC BLOOD PRESSURE: 132 MMHG

## 2023-03-28 DIAGNOSIS — Z09 ENCOUNTER FOR FOLLOW-UP EXAMINATION AFTER COMPLETED TREATMENT FOR CONDITIONS OTHER THAN MALIGNANT NEOPLASM: ICD-10-CM

## 2023-03-28 PROCEDURE — 0503F POSTPARTUM CARE VISIT: CPT

## 2023-03-28 NOTE — PHYSICAL EXAM
[Chaperone Present] : A chaperone was present in the examining room during all aspects of the physical examination [Appropriately responsive] : appropriately responsive [Alert] : alert [No Acute Distress] : no acute distress [Soft] : soft [Non-tender] : non-tender [Non-distended] : non-distended [No HSM] : No HSM [No Lesions] : no lesions [No Mass] : no mass [Oriented x3] : oriented x3 [FreeTextEntry7] : perineo in place, incision healing nicely, no erythema, no signs of infection [FreeTextEntry8] : Ext: DTRx wnl, edema is 2+, non tender bilaterally

## 2023-03-28 NOTE — PLAN
[FreeTextEntry1] : Post partum visit for BP check\par Pt has been having 2 headaches a day that resolve with rest and hydration. \par BP is 132/84, 132/86\par Advised pt to reduce anxiety and try to have enough sleep\par Advised pt to take Tylenol  for headaches, continue to stay well hydrated\par Advised pt to call MD if she experiences severe pain, fever, heavy vaginal bleeding, blurry vision, or severe HAs that don't resolve with rest, hydration, and Tylenol\par \par RTO for 2 week post partum visit in 6 days

## 2023-03-28 NOTE — REVIEW OF SYSTEMS
[Patient Intake Form Reviewed] : Patient intake form was reviewed [Headache] : headache [Negative] : Neurological

## 2023-03-28 NOTE — COUNSELING
Addended by: FLORINA FERREIRA on: 9/26/2019 11:03 AM     Modules accepted: Orders     [Confidentiality] : confidentiality [Pre/Post Op Instructions] : pre/post op instructions

## 2023-03-28 NOTE — HISTORY OF PRESENT ILLNESS
[FreeTextEntry1] : 2023. APURVA MORALES 37 year old female . She presents for a blood pressure check, s/p c/s on 3/20/23.  SHe was seen in ED 3 nights ago because bp at home was elevate.  1st BP at hospital was elevated but rest were wnl.  HELLP labs were WNL. \par \par She had a c/s on 3/20/23 due to breech, to a baby girl, Ivett, 7lbs 8 oz. She currently had a mild headache. She reports 1 to 2 headaches a day that last for about an hour.  They resolve with rest and hydration . Prior to delivery she was having about 8 a day. She has been staying well hydrated. She had blurry vision the night she went ot the ED and non since.  Denies chest pain, SOB. She is breast feeding. She has been sleeping more after being in the ED 2 nights ago.  PHQ9=0\par \par She has had mild cramping since c/s.  VB is mild.  No fevers, no chills.  Denies vaginal discharge or vaginitis symptoms. She reports normal urination, no dysuria, no incontinence of urine. BM is normal per patient, no blood in stool or constipation/diarrhea.\par \par Obhx:  c/s on 3/20/23\par GYNhx: Ovarian cysts. Denies history of fibroids, abnl pap, STI, pelvic infection, breast issues\par PMH: hypothyroid\par PSH: lap cystectomy\par Med: Levothyroxine 175 mg, PNV\par All: Erythromycin, Cefzil, PNC\par Soc: soc alc, non smoker, no drygs\par Famhx: Denies FHx of breast, ovarian, uterine, colon, pancreatic, or prostate cancer.\par

## 2023-04-03 ENCOUNTER — APPOINTMENT (OUTPATIENT)
Dept: OBGYN | Facility: CLINIC | Age: 38
End: 2023-04-03
Payer: COMMERCIAL

## 2023-04-03 VITALS — DIASTOLIC BLOOD PRESSURE: 84 MMHG | SYSTOLIC BLOOD PRESSURE: 133 MMHG

## 2023-04-03 PROCEDURE — 0503F POSTPARTUM CARE VISIT: CPT

## 2023-04-03 NOTE — PHYSICAL EXAM
[Chaperone Present] : A chaperone was present in the examining room during all aspects of the physical examination [Appropriately responsive] : appropriately responsive [Alert] : alert [No Acute Distress] : no acute distress [Soft] : soft [Non-tender] : non-tender [Non-distended] : non-distended [No HSM] : No HSM [No Lesions] : no lesions [No Mass] : no mass [FreeTextEntry7] : Incisions clean, dry, intact

## 2023-04-03 NOTE — HISTORY OF PRESENT ILLNESS
[FreeTextEntry1] : 04/03/2023 APURVA JARREDKRISTINORM 37 year old female presents for 2-wk postpartum visit. Accompanied by spouse. \par \par Patient is doing well and offers no complaints. Exclusively breastfeeding. \par

## 2023-04-03 NOTE — PLAN
[FreeTextEntry1] : 2-wk Postpartum Visit:\par Pt healing well\par Activity restrictions reviewed\par Advised pt to call MD if she experiences severe pain, fever, heavy vaginal bleeding, or severe headaches\par RTO in 4 weeks for full examination

## 2023-04-10 ENCOUNTER — NON-APPOINTMENT (OUTPATIENT)
Age: 38
End: 2023-04-10

## 2023-04-14 ENCOUNTER — NON-APPOINTMENT (OUTPATIENT)
Age: 38
End: 2023-04-14

## 2023-05-04 ENCOUNTER — NON-APPOINTMENT (OUTPATIENT)
Age: 38
End: 2023-05-04

## 2023-05-05 ENCOUNTER — APPOINTMENT (OUTPATIENT)
Dept: OBGYN | Facility: CLINIC | Age: 38
End: 2023-05-05
Payer: COMMERCIAL

## 2023-05-05 VITALS
SYSTOLIC BLOOD PRESSURE: 110 MMHG | WEIGHT: 224 LBS | DIASTOLIC BLOOD PRESSURE: 73 MMHG | HEIGHT: 67 IN | BODY MASS INDEX: 35.16 KG/M2

## 2023-05-05 PROCEDURE — 0503F POSTPARTUM CARE VISIT: CPT

## 2023-05-08 LAB — HPV HIGH+LOW RISK DNA PNL CVX: NOT DETECTED

## 2023-05-10 LAB — CYTOLOGY CVX/VAG DOC THIN PREP: NORMAL

## 2023-05-16 ENCOUNTER — APPOINTMENT (OUTPATIENT)
Dept: OBGYN | Facility: CLINIC | Age: 38
End: 2023-05-16

## 2023-07-27 ENCOUNTER — NON-APPOINTMENT (OUTPATIENT)
Age: 38
End: 2023-07-27

## 2023-12-20 ENCOUNTER — APPOINTMENT (OUTPATIENT)
Dept: HUMAN REPRODUCTION | Facility: CLINIC | Age: 38
End: 2023-12-20

## 2024-01-23 NOTE — H&P PST ADULT - URINARY CATHETER
ACP was discuss during the visit    Subjective   Patient ID: Khushbu is a 58 year old female.    Chief Complaint   Patient presents with    Office Visit     cardiology    Follow-up     Last visit 01/09/24  Labs 01/10/24  CXR 01/10/24         HPI:   ====  The interview was conducted with the help of professional   Not much better  Had a chest x-ray done  Still coughing  No PND no orthopnea  No chest pain or angina      Past Medical History:   Diagnosis Date    Essential (primary) hypertension     H/O: hysterectomy     High cholesterol        ALLERGIES:   Allergen Reactions    Opioid Analgesics SWELLING    Morphine DIZZINESS, PRURITUS, RASH, VOMITING and HIVES        Past Surgical History:   Procedure Laterality Date    Total abdom hysterectomy  2013       Family History   Problem Relation Age of Onset    Diabetes Mother     Hypertension Mother     Myocardial Infarction Mother     Myocardial Infarction Father     Diabetes Father     Hypertension Father        Social History     Tobacco Use    Smoking status: Never    Smokeless tobacco: Never   Vaping Use    Vaping Use: never used   Substance Use Topics    Alcohol use: Never    Drug use: Never        Recent hospitalization:  ====================   None     Review of Systems:   ================     Constitutional:  No chills, and no malaise  Ears, nose, mouth, throat, and face:  No pain, no swelling  Eyes:  No change in eyesight, no pain  Cardiovascular:  No Loss of consciousness  Respiratory:  No hemoptysis  Gastrointestinal: Rectal bleed, NO MORE   Genitourinary:  No hematuria   Neurological:  No change in speech  Musculoskeletal:  No muscle pain  Behavioral/Psych:  No mood changes  Skin : no itching    =============================================================================    Objective  =========  Vitals:    01/23/24 1024   BP: 119/83   BP Location: LUE - Left upper extremity   Patient Position: Sitting   Cuff Size: Regular   Pulse: 84   Temp: 98.5 °F  (36.9 °C)   TempSrc: Temporal   SpO2: 97%   Weight: 71.6 kg (157 lb 11.8 oz)   Height: 5' 4\" (1.626 m)   PainSc:  0        Physical Exam:  =============  General: Alert, cooperative, no distress.  Obese  Head: No obvious abnormality  Eyes: Normal, no jaundice.  Throat: Lips, mucosa, moist and normal.  Neck: Supple.  Back: Symmetric.  Lungs: Clear,   Heart: Minimal wheezing  Abdomen: Soft, non-tender.  Extremities: No edema.  Skin: Normal texture  Neurologic: No clear deficit.    =============================================================================    Data  =====  Recent Labs   Lab 01/10/24  0939   Cholesterol 175   HDL 31*   Triglycerides 304*   CALCLDL 83   Non-HDL Cholesterol 144       Recent Labs   Lab 01/10/24  0939   Sodium 140   Chloride 109   BUN 15   Potassium 4.8   Glucose 102*   Creatinine 0.62   Calcium 9.2        No results found for: \"HGBA1C\".    Recent Labs   Lab 01/10/24  0939 06/09/23  2354   WBC 9.0 9.9   RBC 4.76 4.73   HGB 13.2 13.2   HCT 41.5 40.4   MCV 87.2 85.4   MCHC 31.8* 32.7   RDW-CV 13.2 13.2    329   Lymphocytes, Percent  --  38           SINUS TACHY    LAD  TERMINAL CONDUCTION DELAY      STRESS ECHO 01/2023    1. Procedure narrative: Treadmill exercise testing was performed using the     Young protocol. The patient exercised for 7 min 16 sec, to protocol stage     3, to a maximal work rate of 8.9mets. Exercise was terminated achievement     of target heart rate and fatigue.  2. Stress data: Stress testing did not produce any symptoms.  3. Stress ECG conclusions: [facility] scoring: exercise time of 7 min; maximum ST     deviation of 0mm; no angina; resulting score is 7. This score predicts a     low risk of cardiac events.     CXR 01/2024  ===============  No radiographic evidence of acute cardiopulmonary process.     ==============================================================================     Assessment :  ============    Bronchitis  (primary encounter diagnosis)  Primary  hypertension  Mixed hyperlipidemia    Plan:  =====  Bronchitis/post flu residual cough  =============================  About the same probably asthmatic  Albuterol  She is on Advair  To see pulmonologist         Hypertension  ============  Change losartan to amlodipine           Hyperlipidemia  ===========  Low-cholesterol diet  LDL is elevated   Crestor             Current Outpatient Medications   Medication Sig Dispense Refill    Symbicort 160-4.5 MCG/ACT inhaler 2 puffs 2 times daily.      amoxicillin-clavulanate (AUGMENTIN) 500-125 MG per tablet Take 1 tablet by mouth every 12 hours. For 10 days      ibuprofen (MOTRIN) 400 MG tablet Take 400 mg by mouth.      albuterol 108 (90 Base) MCG/ACT inhaler Inhale 1-2 puffs into the lungs every 4 hours as needed for Shortness of Breath or Wheezing. 1 each 3    amLODIPine (NORVASC) 5 MG tablet Take 1 tablet by mouth daily. 90 tablet 3    rosuvastatin (CRESTOR) 40 MG tablet Take 1 tablet by mouth daily. 90 tablet 3     No current facility-administered medications for this visit.          Electronically signed by:  Anthony Farrar MD, 1/23/2024    no

## 2024-02-12 ENCOUNTER — APPOINTMENT (OUTPATIENT)
Dept: HUMAN REPRODUCTION | Facility: CLINIC | Age: 39
End: 2024-02-12
Payer: COMMERCIAL

## 2024-02-12 PROCEDURE — 76830 TRANSVAGINAL US NON-OB: CPT

## 2024-02-12 PROCEDURE — 99205 OFFICE O/P NEW HI 60 MIN: CPT | Mod: 25

## 2024-02-12 PROCEDURE — 36415 COLL VENOUS BLD VENIPUNCTURE: CPT

## 2024-05-06 ENCOUNTER — APPOINTMENT (OUTPATIENT)
Dept: OBGYN | Facility: CLINIC | Age: 39
End: 2024-05-06

## 2024-10-30 ENCOUNTER — APPOINTMENT (OUTPATIENT)
Dept: OBGYN | Facility: CLINIC | Age: 39
End: 2024-10-30
Payer: COMMERCIAL

## 2024-10-30 ENCOUNTER — NON-APPOINTMENT (OUTPATIENT)
Age: 39
End: 2024-10-30

## 2024-10-30 ENCOUNTER — ASOB RESULT (OUTPATIENT)
Age: 39
End: 2024-10-30

## 2024-10-30 VITALS
DIASTOLIC BLOOD PRESSURE: 80 MMHG | BODY MASS INDEX: 35.94 KG/M2 | SYSTOLIC BLOOD PRESSURE: 135 MMHG | WEIGHT: 229 LBS | HEIGHT: 67 IN

## 2024-10-30 DIAGNOSIS — Z98.891 HISTORY OF UTERINE SCAR FROM PREVIOUS SURGERY: ICD-10-CM

## 2024-10-30 DIAGNOSIS — Z78.9 OTHER SPECIFIED HEALTH STATUS: ICD-10-CM

## 2024-10-30 DIAGNOSIS — Z3A.12 12 WEEKS GESTATION OF PREGNANCY: ICD-10-CM

## 2024-10-30 PROCEDURE — 76801 OB US < 14 WKS SINGLE FETUS: CPT

## 2024-10-30 PROCEDURE — 0500F INITIAL PRENATAL CARE VISIT: CPT

## 2024-10-30 PROCEDURE — 36415 COLL VENOUS BLD VENIPUNCTURE: CPT

## 2024-10-31 LAB
ABO + RH PNL BLD: NORMAL
ALBUMIN SERPL ELPH-MCNC: 4.2 G/DL
ALP BLD-CCNC: 52 U/L
ALT SERPL-CCNC: 7 U/L
ANION GAP SERPL CALC-SCNC: 21 MMOL/L
AST SERPL-CCNC: 11 U/L
BASOPHILS # BLD AUTO: 0.02 K/UL
BASOPHILS NFR BLD AUTO: 0.2 %
BILIRUB SERPL-MCNC: <0.2 MG/DL
BLD GP AB SCN SERPL QL: NORMAL
BUN SERPL-MCNC: 9 MG/DL
C TRACH RRNA SPEC QL NAA+PROBE: NOT DETECTED
CALCIUM SERPL-MCNC: 9.3 MG/DL
CHLORIDE SERPL-SCNC: 100 MMOL/L
CO2 SERPL-SCNC: 17 MMOL/L
CREAT SERPL-MCNC: 0.5 MG/DL
EGFR: 124 ML/MIN/1.73M2
EOSINOPHIL # BLD AUTO: 0.12 K/UL
EOSINOPHIL NFR BLD AUTO: 1.1 %
GLUCOSE SERPL-MCNC: 63 MG/DL
HCT VFR BLD CALC: 36.9 %
HGB A MFR BLD: 97.5 %
HGB A2 MFR BLD: 2.5 %
HGB BLD-MCNC: 12 G/DL
HGB FRACT BLD-IMP: NORMAL
HIV1+2 AB SPEC QL IA.RAPID: NONREACTIVE
HPV HIGH+LOW RISK DNA PNL CVX: NOT DETECTED
IMM GRANULOCYTES NFR BLD AUTO: 0.8 %
LYMPHOCYTES # BLD AUTO: 2.41 K/UL
LYMPHOCYTES NFR BLD AUTO: 22.5 %
MAN DIFF?: NORMAL
MCHC RBC-ENTMCNC: 31.5 PG
MCHC RBC-ENTMCNC: 32.5 G/DL
MCV RBC AUTO: 96.9 FL
MONOCYTES # BLD AUTO: 0.65 K/UL
MONOCYTES NFR BLD AUTO: 6.1 %
N GONORRHOEA RRNA SPEC QL NAA+PROBE: NOT DETECTED
NEUTROPHILS # BLD AUTO: 7.4 K/UL
NEUTROPHILS NFR BLD AUTO: 69.3 %
PLATELET # BLD AUTO: 214 K/UL
POTASSIUM SERPL-SCNC: 3.8 MMOL/L
PROT SERPL-MCNC: 6.7 G/DL
RBC # BLD: 3.81 M/UL
RBC # FLD: 13 %
SODIUM SERPL-SCNC: 139 MMOL/L
SOURCE AMPLIFICATION: NORMAL
T PALLIDUM AB SER QL IA: NEGATIVE
T4 FREE SERPL-MCNC: 1.5 NG/DL
TSH SERPL-ACNC: 1.93 UIU/ML
WBC # FLD AUTO: 10.69 K/UL

## 2024-11-01 LAB
HBV SURFACE AG SER QL: NONREACTIVE
HCV AB SER QL: NONREACTIVE
HCV S/CO RATIO: 0.1 S/CO
LEAD BLD-MCNC: <1 UG/DL
MEV IGG FLD QL IA: 138 AU/ML
MEV IGG+IGM SER-IMP: POSITIVE
RUBV IGG FLD-ACNC: 2.47 INDEX
RUBV IGG SER-IMP: POSITIVE
VZV AB TITR SER: POSITIVE
VZV IGG SER IF-ACNC: 14.1 S/CO

## 2024-11-02 LAB — BACTERIA UR CULT: NORMAL

## 2024-11-04 LAB — CYTOLOGY CVX/VAG DOC THIN PREP: NORMAL

## 2024-11-14 ENCOUNTER — APPOINTMENT (OUTPATIENT)
Dept: OBGYN | Facility: CLINIC | Age: 39
End: 2024-11-14
Payer: COMMERCIAL

## 2024-11-14 PROCEDURE — 36415 COLL VENOUS BLD VENIPUNCTURE: CPT

## 2024-11-22 ENCOUNTER — NON-APPOINTMENT (OUTPATIENT)
Age: 39
End: 2024-11-22

## 2025-04-09 NOTE — H&P PST ADULT - PROBLEM SELECTOR PLAN 1
----- Message from [place] sent at 4/9/2025 12:34 PM CDT -----  Type: Patient callWho called: Patient Does the patient know what this is regarding? Requesting a call back to verify the time he needs to come in for procedure on 4/11 ; please advise Would the patient rather a call back or response via [facility]? [facility]: [phone] Additional information:  
Staff attempted to contact patient to inform that the procedure department reached out via portal message regarding arrival time for procedure. Staff unable to leave voicemail   
Laparoscopic right ovarian cystectomy on 2/6/19.